# Patient Record
Sex: FEMALE | Race: ASIAN | NOT HISPANIC OR LATINO | Employment: UNEMPLOYED | URBAN - METROPOLITAN AREA
[De-identification: names, ages, dates, MRNs, and addresses within clinical notes are randomized per-mention and may not be internally consistent; named-entity substitution may affect disease eponyms.]

---

## 2017-01-16 ENCOUNTER — OFFICE VISIT (OUTPATIENT)
Dept: FAMILY MEDICINE | Facility: CLINIC | Age: 57
End: 2017-01-16

## 2017-01-16 VITALS
WEIGHT: 134.6 LBS | OXYGEN SATURATION: 97 % | SYSTOLIC BLOOD PRESSURE: 136 MMHG | HEIGHT: 59 IN | HEART RATE: 82 BPM | RESPIRATION RATE: 18 BRPM | DIASTOLIC BLOOD PRESSURE: 82 MMHG | TEMPERATURE: 98.3 F | BODY MASS INDEX: 27.13 KG/M2

## 2017-01-16 DIAGNOSIS — R10.13 EPIGASTRIC PAIN: ICD-10-CM

## 2017-01-16 DIAGNOSIS — F41.9 ANXIETY: Primary | ICD-10-CM

## 2017-01-16 RX ORDER — OMEPRAZOLE 40 MG/1
40 CAPSULE, DELAYED RELEASE ORAL DAILY
Qty: 30 CAPSULE | Refills: 1 | Status: SHIPPED
Start: 2017-01-16 | End: 2017-12-08

## 2017-01-16 RX ORDER — ESCITALOPRAM OXALATE 20 MG/1
20 TABLET ORAL DAILY
Qty: 90 TABLET | Refills: 1 | Status: SHIPPED
Start: 2017-01-16 | End: 2017-12-08

## 2017-01-16 NOTE — PROGRESS NOTES
Preceptor Attestation:  Patient's case reviewed and discussed with Yanet Rossi,  resident and I evaluated the patient. I agree with written assessment and plan of care.  Supervising Physician:  Frederic Finn MD  [facility]

## 2017-01-16 NOTE — PATIENT INSTRUCTIONS
We will send you to the gastroenterologist for further evaluation of your stomach pain.           Referral faxed to [facility] and they will contact patient to schedule appointment.   Munir    What: Gastro  Where: [facility],  [address] , [place] [address]  When: 03/24/17 at 10:10am    Who is with:   Telephone: [phone]  Fax:   Any additional comments: The pt called to have me reschedule her appointment with gastrom, which I was able to rescheduled for the above date and time.    Scheduled by: RIKY Long

## 2017-01-16 NOTE — PROGRESS NOTES
HPI:       Bushra Levy is a 57 year old  female with a significant past medical history of gallbladder polyp, Hx positive FIT (refused colonoscopy), and nonalcoholic steatohepatitis who presents for follow up of concern(s) listed below    ANXIETY -   - has been taking Lexapro.   - It has helped a little bit.   - Has had counseling in the past, was not helpful, not interested in returning.     ABDOMINAL PAIN - chronic, ~ 5 years duration  - Pain more severe when hungry, worse early in the morning. RUQ. Can tell when pressure on it. Has sour bitter taste in mouth at night.   -  Quit taking omeprazole. Pain is not worsening.   - No dark or bloody stools.   - Has nausea after eating sometimes.   - Has not had EGD in past.   - several previous negative H. Pylori tests.     A [employer]  was used for this visit         PMHX:     Patient Active Problem List   Diagnosis     Esophageal reflux     Vitamin D insufficiency     Pre-diabetes     Elevated BP     Fatty liver disease, nonalcoholic     Polyp of gallbladder     Pulmonary nodules     Positive FIT (fecal immunochemical test)     Chronic rhinitis     Encounter for screening for cervical cancer        Current Outpatient Prescriptions   Medication Sig Dispense Refill     cholecalciferol (VITAMIN D) 1000 UNIT tablet Take 1 tablet (1,000 Units) by mouth daily 60 tablet 0     loratadine (CLARITIN) 10 MG tablet Take 1 tablet (10 mg) by mouth daily as needed for allergies 90 tablet 1     escitalopram (LEXAPRO) 10 MG tablet Take 1 tablet (10 mg) by mouth daily 30 tablet 1     blood glucose monitoring (SOFTCLIX) lancets Use to test blood sugar 2 times daily or as directed. 1 Box 1     blood glucose monitoring (ACCU-CHEK JACQUELIN PLUS) meter device kit Use to test blood sugars 1-2 times daily or as directed. 1 kit 0     blood glucose (ACCU-CHEK JACQUELIN) test strip Use to test blood sugars 2 times daily or as directed. 100 strip prn     IBUPROFEN PO Take 2 tablets by  "mouth 3 times daily       Allergies   Allergen Reactions     Nka [No Known Allergies]           Review of Systems:   See HPI above.           Physical Exam:     Filed Vitals:    01/16/17 1020   BP: 136/82   Pulse: 82   Temp: 98.3  F (36.8  C)   TempSrc: Oral   Resp: 18   Height: 4' 11\" (149.9 cm)   Weight: 134 lb 9.6 oz (61.054 kg)   SpO2: 97%     Body mass index is 27.17 kg/(m^2).    GENERAL: alert, oriented, no acute distress  HEART: RRR, no MRG  LUNGS: CTAB, unlabored  ABDOMEN: soft, mildly tender in epigastric/RUQ region  PSYCH: well groomed, good eye contact, mood good, affect congruent with mood.     Assessment and Plan     Bushra is a 58 yo H[place] female presenting for follow up on chronic abdominal pain and anxiety.     1. Anxiety  Mildly improved. Started Lexapro 10 mg almost 1 month ago. Titrate up to 20 mg po daily. Recommended psychology referral for psychotherapy, Bushra declines.   - escitalopram (LEXAPRO) 20 MG tablet; Take 1 tablet (20 mg) by mouth daily  Dispense: 90 tablet; Refill: 1    2. Epigastric pain  Chronic pain, worsening GERD symptoms following discontinuation of omeprazole. Restart omeprazole. Referral to GI for further evaluation, consider EGD with biopsies for H. Pylori. She has a history of positive FIT and should have colonoscopy. Should have counseling to do both tests at the same time.   - omeprazole (PRILOSEC) 40 MG capsule; Take 1 capsule (40 mg) by mouth daily Take 30-60 minutes before a meal.  Dispense: 30 capsule; Refill: 1  - GASTROENTEROLOGY ADULT REFERRAL +/- PROCEDURE; Future    Options for treatment and follow-up care were reviewed with the patient and/or guardian. Bushra Levy and/or guardian engaged in the decision making process and verbalized understanding of the options discussed and agreed with the final plan.    Yanet Rossi DO    Precepted today with: Frederic Finn MD      "

## 2017-02-24 ENCOUNTER — TELEPHONE (OUTPATIENT)
Dept: FAMILY MEDICINE | Facility: CLINIC | Age: 57
End: 2017-02-24

## 2017-02-24 NOTE — TELEPHONE ENCOUNTER
I got a call from the pt today, pt called to have me help reschedule her appointment with [facility].  The pt was not sure if she had an appointment with them already or not.  Pt stated that she will only be available on Mondays, Thursdays, and Fridays.  Pt stated that if I can, she would like an appointment round 10:00am.  I told her that I will call and see what I can do, I will let her know once I am able to set up her appointment.      I called [facility], they informed me that the pt has an appointment on 02/28/17 at 2:10pm, which is a Tuesday.  I told them to reschedule it for the days the pt requested it for.  We were able to set it up for 03/24/17 at 10:10am, which is a Friday.      I called the pt and informed her of her appointment time and date.  I told her that I will print out the facility information for her, write her appointment time and date on it, and mail it to her.

## 2017-02-27 DIAGNOSIS — E55.9 VITAMIN D INSUFFICIENCY: ICD-10-CM

## 2017-02-28 LAB — 25(OH)D3 SERPL-MCNC: 15.8 NG/ML (ref 30–80)

## 2017-03-06 DIAGNOSIS — E55.9 VITAMIN D INSUFFICIENCY: ICD-10-CM

## 2017-03-06 RX ORDER — CHOLECALCIFEROL (VITAMIN D3) 50 MCG
1000 TABLET ORAL DAILY
Qty: 30 TABLET | Refills: 1 | Status: SHIPPED
Start: 2017-03-06 | End: 2017-06-28

## 2017-03-06 NOTE — TELEPHONE ENCOUNTER
----- Message from Yanet Rossi, DO sent at 3/3/2017  2:16 PM CST -----  Can you call Bushra to see if she took her vitamin D supplement as prescribed? Her vitamin D level is even lower than last check.    Thank you,    Yanet Rossi, DO

## 2017-03-06 NOTE — TELEPHONE ENCOUNTER
Called patient with the assistance of zulema lynn. And patient hasn't taken Vitamin D for a month and needs a new refill.

## 2017-03-07 NOTE — TELEPHONE ENCOUNTER
I have refilled Ms. Levy's vitamin D supplement. I increased dose to 2000 units daily. Ms. Levy should return for a recheck of vitamin D level in 2 months.     Yanet Rossi, DO

## 2017-03-24 ENCOUNTER — RECORDS - HEALTHEAST (OUTPATIENT)
Dept: ADMINISTRATIVE | Facility: OTHER | Age: 57
End: 2017-03-24

## 2017-03-24 ENCOUNTER — TRANSFERRED RECORDS (OUTPATIENT)
Dept: HEALTH INFORMATION MANAGEMENT | Facility: CLINIC | Age: 57
End: 2017-03-24

## 2017-03-28 ENCOUNTER — HOSPITAL ENCOUNTER (OUTPATIENT)
Dept: ULTRASOUND IMAGING | Facility: HOSPITAL | Age: 57
Discharge: HOME | End: 2017-03-28
Attending: INTERPRETER

## 2017-03-28 ENCOUNTER — TRANSFERRED RECORDS (OUTPATIENT)
Dept: HEALTH INFORMATION MANAGEMENT | Facility: CLINIC | Age: 57
End: 2017-03-28

## 2017-03-28 DIAGNOSIS — R10.11 RUQ ABDOMINAL PAIN: ICD-10-CM

## 2017-06-23 ENCOUNTER — OFFICE VISIT (OUTPATIENT)
Dept: FAMILY MEDICINE | Facility: CLINIC | Age: 57
End: 2017-06-23

## 2017-06-23 VITALS
BODY MASS INDEX: 27.05 KG/M2 | SYSTOLIC BLOOD PRESSURE: 130 MMHG | WEIGHT: 134.2 LBS | HEIGHT: 59 IN | HEART RATE: 76 BPM | OXYGEN SATURATION: 99 % | TEMPERATURE: 97.9 F | DIASTOLIC BLOOD PRESSURE: 76 MMHG

## 2017-06-23 DIAGNOSIS — R73.03 PRE-DIABETES: ICD-10-CM

## 2017-06-23 DIAGNOSIS — M67.911 TENDINOPATHY OF ROTATOR CUFF, RIGHT: ICD-10-CM

## 2017-06-23 DIAGNOSIS — N28.1 RENAL CYST, RIGHT: ICD-10-CM

## 2017-06-23 DIAGNOSIS — K21.9 GASTROESOPHAGEAL REFLUX DISEASE, ESOPHAGITIS PRESENCE NOT SPECIFIED: ICD-10-CM

## 2017-06-23 DIAGNOSIS — R03.0 ELEVATED BLOOD PRESSURE READING WITHOUT DIAGNOSIS OF HYPERTENSION: ICD-10-CM

## 2017-06-23 DIAGNOSIS — E55.9 VITAMIN D INSUFFICIENCY: Primary | ICD-10-CM

## 2017-06-23 LAB — HBA1C MFR BLD: 5.9 % (ref 4.1–5.7)

## 2017-06-23 NOTE — PROGRESS NOTES
HPI:       Bushra Levy is a 57 year old  female with a significant past medical history of elevated BP without diagnosis of hypertension, esophageal reflux, pre-diabetes, and vitamin D deficiency who presents to discuss the following.     ELEVATED BLOOD PRESSURE - #1: 145/79 #2: 130/76  - No orthopnea, no lower extremity edema, no chest pain, no dyspnea on exertion    PREDIABETES -   - A1c 6.2 in May 2015  - BMI 27  - No polydipsia, no polyuria, no weight loss, no changed in vision   - Diet: vegetables, meat, a little rice  - Exercise: 2-3x per week, walks around 2 blocks 3 times      VITAMIN D INSUFFICIENCY -   - Completed 2 month course of supplementation 2000 IU daily  - Unclear if feels any different  - Gets sun exposure in the summer     ANXIETY -   - Taking Lexapro as needed, helps a little bit    EPIGASTRIC PAIN -   - Went to [facility], had US, no EGD and no H. Pylori testing per patient report, pending labs  - Has reflux daily when wakes up   - Takes ibuprofen once a day, she has pain in arms and hard to reach, improved w/ ibuprofen. [facility]. Has used acetaminophen in the past.   - Omeprazole - takes daily     SHOULDER PAIN -   - Chronic shoulder pain   - Hurts when reaches above head  - Improved with ibuprofen, acetaminophen less helpful   - No weakness  - Good ROM  - Bilateral symptoms, but R >>> L           PMHX:     Patient Active Problem List   Diagnosis     Esophageal reflux     Vitamin D insufficiency     Pre-diabetes     Elevated BP     Fatty liver disease, nonalcoholic     Polyp of gallbladder     Pulmonary nodules     Positive FIT (fecal immunochemical test)     Chronic rhinitis     Encounter for screening for cervical cancer        Current Outpatient Prescriptions   Medication Sig Dispense Refill     omeprazole (PRILOSEC) 40 MG capsule Take 1 capsule (40 mg) by mouth daily Take 30-60 minutes before a meal. 30 capsule 1     loratadine (CLARITIN) 10 MG tablet Take 1 tablet  "(10 mg) by mouth daily as needed for allergies 90 tablet 1     cholecalciferol 2000 UNITS TABS Take 1,000 Units by mouth daily (Patient not taking: Reported on 6/23/2017) 30 tablet 1     escitalopram (LEXAPRO) 20 MG tablet Take 1 tablet (20 mg) by mouth daily (Patient not taking: Reported on 6/23/2017) 90 tablet 1     blood glucose monitoring (SOFTCLIX) lancets Use to test blood sugar 2 times daily or as directed. 1 Box 1     blood glucose monitoring (ACCU-CHEK JACQUELIN PLUS) meter device kit Use to test blood sugars 1-2 times daily or as directed. 1 kit 0     blood glucose (ACCU-CHEK JACQUELIN) test strip Use to test blood sugars 2 times daily or as directed. 100 strip prn     IBUPROFEN PO Take 2 tablets by mouth 3 times daily        Allergies   Allergen Reactions     Food      Some food allergies     Nka [No Known Allergies]             Review of Systems:   See HPI above           Physical Exam:     Vitals:    06/23/17 1051 06/23/17 1328   BP: 145/79 130/76   BP Location: Right arm    Patient Position: Chair    Cuff Size: Adult Regular    Pulse: 76    Temp: 97.9  F (36.6  C)    TempSrc: Oral    SpO2: 99%    Weight: 134 lb 3.2 oz (60.9 kg)    Height: 4' 11\" (149.9 cm)      Body mass index is 27.11 kg/(m^2).    GENERAL: middle age  female, pleasant, no acute distress, well groomed  HEART: regular rate and rhythm, no murmurs  LUNGS: clear to auscultation bilaterally  ABDOMEN: soft, nontender to palpation, bowel sounds present  MSK: active shoulder ROM full bilaterally, strength RUQ 5/5 throughout bilaterally, sensation intact, negative arm drop test, empty can test painful on right    Assessment and Plan     1. Vitamin D insufficiency  History of vitamin D deficiency, s/p replacement x 2 months with vit D 2000 IU daily. No change in symptoms. Will recheck today.   Encouraged daily sun exposure x 20 minutes (then sunscreen for cancer prevention). Although may not feel different, important for bone health to have normal " vitamin D levels.   - Vitamin D 25-Hydroxy ([facility])    2. Pre-diabetes  Due for repeat A1c today. Returned at 5.9 (improved from 6.2 in May 2015). She has a family member (mother) with diabetes. We reviewed complications of diabetes (blindness, heart disease, kidney failure, skin infections, vascular damage, amputations) and desire to prevent/prolong transition to overt diabetes (Dx w/ A1c 6.5+). Bushra has no symptoms of diabetes disease. Encouraged continued emphasis on a healthy diet, predominately meats and vegetables, okay to have small portions of rice. Discussed high glycemic index of rice and large spikes in blood sugars with rice. Also encouraged to continue with regular exercise.   Discuss metformin for overweight patients with prediabetes to help delay progression to diabetes.   - Hemoglobin A1c ([facility])    3. Elevated BP  BP initially elevated, but after allowing time to rest, BP normal range. Screen for symptoms of CHF secondary to HTN negative. Continue to monitor BP's at each visit. Goal BP < 140/90 until 63 yo.     4. Renal cyst, right  Per patient's result letter from [facility], simple renal cyst present on ultrasound of RUQ. No mention of size. Pending [facility] records for further details.     5. Gastroesophageal reflux disease, esophagitis presence not specified  Referred to [facility] for EGD and H pylori biopsies, however patient had RUQ ultrasound instead. Still symptomatic with reflux every morning. Discussed daily use of NSAIDs are likely playing a role. Recommended cessation of NSAID use. Will continue omeprazole and test for H. Pylori today.   - H. Pylori Agn Fecal ([facility]); Future - Negative.   PVC call to [facility] - patient refused EGD in April 2017.   With negative H pylori, will continue to monitor closely for red flag symptoms and encouraging lifestyle changes to decrease aggravation of GERD.    6. Tendinopathy of rotator cuff, right  No sign of RC tear or frozen shoulder. I do not suspect severe  arthritis as ROM is full. Will refer to physical therapy for rehabilitation. Encouraged rest as possible. Trial acetaminophen for pain instead of NSAIDs.   - PHYSICAL THERAPY REFERRAL; Future    Options for treatment and follow-up care were reviewed with the patient and/or guardian. Bushra Levy and/or guardian engaged in the decision making process and verbalized understanding of the options discussed and agreed with the final plan.    Yanet Rossi DO      Precepted today with: Ar Mena MD

## 2017-06-23 NOTE — MR AVS SNAPSHOT
After Visit Summary   6/23/2017    Bushra Levy    MRN: [id number]           Patient Information     Date Of Birth          1960        Visit Information        Provider Department      6/23/2017 11:00 AM Yanet Rossi, DO [facility]        Today's Diagnoses     Vitamin D insufficiency    -  1    Pre-diabetes        Elevated blood pressure reading without diagnosis of hypertension        Renal cyst, right        Gastroesophageal reflux disease, esophagitis presence not specified        Tendinopathy of rotator cuff, right          Care Instructions    Referral for ( TEST )  :      Physical Therapy  LOCATION/PLACE/Provider :    [facility]  [address] [place] [address]  DATE & TIME :     6-29-17 at 2:30  PHONE :     [phone]  FAX :     [phone]  ADDITIONAL INFORMATION :     NA  Appointment made by clinic staff/:    [facility]AC[facility]O            Follow-ups after your visit        Additional Services     PHYSICAL THERAPY REFERRAL       PT/OT REFERRAL  Bushra Levy  1960  Phone #: [phone] (home)    needed: Yes  Language: Appleong    PT/OT  Facility:   Other: Patient preference    History: Chronic right shoulder pain, rotator cuff tendinopathy    Precautions/Contraindications: None    Imaging Studies: None    Surgical Procedure/Test Results: None    Treatment Goals:   Increase: Flexibility, Strength and ROM  Decrease: Pain    Prognosis: excellent    Visits: Up to 12    Evaluate: Evaluate and treat    Plan: Manual Therapy, Flexibility Exercise and Strength Exercise                  Who to contact     Please call your clinic at [phone] to:    Ask questions about your health    Make or cancel appointments    Discuss your medicines    Learn about your test results    Speak to your doctor   If you have compliments or concerns about an experience at your clinic, or if you wish to file a complaint, please contact [facility]  "Patient Relations at [phone] or email us at [email or web address]         Additional Information About Your Visit        [employer] Information     Info is an electronic gateway that provides easy, online access to your medical records. With Info, you can request a clinic appointment, read your test results, renew a prescription or communicate with your care team.     To sign up for Info visit the website at [email or web address]   You will be asked to enter the access code listed below, as well as some personal information. Please follow the directions to create your username and password.     Your access code is: [email or web address]  Expires: 10/4/2017  8:07 AM     Your access code will  in 90 days. If you need help or a new code, please contact your [facility] or call [phone] for assistance.        Care EveryWhere ID     This is your Care EveryWhere ID. This could be used by other organizations to access your [facility] medical records  [id number]        Your Vitals Were     Pulse Temperature Height Pulse Oximetry BMI (Body Mass Index)       76 97.9  F (36.6  C) (Oral) 4' 11\" (149.9 cm) 99% 27.11 kg/m2        Blood Pressure from Last 3 Encounters:   17 130/76   17 136/82   16 136/80    Weight from Last 3 Encounters:   17 134 lb 3.2 oz (60.9 kg)   17 134 lb 9.6 oz (61.1 kg)   16 131 lb 6.4 oz (59.6 kg)              We Performed the Following     Hemoglobin A1c ([facility])     Vitamin D 25-Hydroxy ([facility])          Where to get your medicines      [id number] - [place], [place] - [address] AT [facility] & [address], [place] [address]     Phone:  [phone]     Vitamin D3 2000 UNITS Tabs          Primary Care Provider Office Phone # Fax #    Yanet Marva Rossi [phone]       [facility] " [address] [facility] [address]        Equal Access to Services     CEDRICCOLLEEN CALVIN : Hadii kelsie ku doris Burrell, waamberda luqantoniaha, qaleobardota kakenny nicanortrelllea, waxharsha igorin hayaaivonne vickgodfrey santamariatati zelaya. So [facility] [phone].    ATENCIÓN: Si habla español, tiene a ryan disposición servicios gratuitos de asistencia lingüística. Llame al [phone].    We comply with applicable federal civil rights laws and [place] laws. We do not discriminate on the basis of race, color, national origin, age, disability sex, sexual orientation or gender identity.            Thank you!     Thank you for choosing [facility]  for your care. Our goal is always to provide you with excellent care. Hearing back from our patients is one way we can continue to improve our services. Please take a few minutes to complete the written survey that you may receive in the mail after your visit with us. Thank you!             Your Updated Medication List - Protect others around you: Learn how to safely use, store and throw away your medicines at www.disposemymeds.org.          This list is accurate as of: 6/23/17 11:59 PM.  Always use your most recent med list.                   Brand Name Dispense Instructions for use Diagnosis    blood glucose monitoring lancets     1 Box    Use to test blood sugar 2 times daily or as directed.        blood glucose monitoring meter device kit     1 kit    Use to test blood sugars 1-2 times daily or as directed.        blood glucose monitoring test strip    ACCU-CHEK JACQUELIN    100 strip    Use to test blood sugars 2 times daily or as directed.    Prediabetes       escitalopram 20 MG tablet    LEXAPRO    90 tablet    Take 1 tablet (20 mg) by mouth daily    Anxiety       IBUPROFEN PO      Take 2 tablets by mouth 3 times daily        loratadine 10 MG tablet    CLARITIN    90 tablet    Take 1 tablet (10 mg) by mouth daily as needed for allergies    Chronic rhinitis       omeprazole 40 MG capsule     priLOSEC    30 capsule    Take 1 capsule (40 mg) by mouth daily Take 30-60 minutes before a meal.    Epigastric pain       Vitamin D3 2000 UNITS Tabs     90 tablet    Take 1,000 Units by mouth daily    Vitamin D insufficiency

## 2017-06-23 NOTE — PATIENT INSTRUCTIONS
Referral for ( TEST )  :      Physical Therapy  LOCATION/PLACE/Provider :    [facility]  [address] [place] [address]  DATE & TIME :     6-29-17 at 2:30  PHONE :     [phone]  FAX :     [phone]  ADDITIONAL INFORMATION :     NA  Appointment made by clinic staff/:    [facility]

## 2017-06-26 ENCOUNTER — AMBULATORY - HEALTHEAST (OUTPATIENT)
Dept: ADMINISTRATIVE | Facility: REHABILITATION | Age: 57
End: 2017-06-26

## 2017-06-26 DIAGNOSIS — M67.911 TENDINOPATHY OF ROTATOR CUFF, RIGHT: ICD-10-CM

## 2017-06-26 LAB — 25(OH)D3 SERPL-MCNC: 26.7 NG/ML (ref 30–80)

## 2017-06-27 DIAGNOSIS — K21.9 GASTROESOPHAGEAL REFLUX DISEASE, ESOPHAGITIS PRESENCE NOT SPECIFIED: ICD-10-CM

## 2017-06-28 LAB — H PYLORI AG STL QL IA: NEGATIVE

## 2017-06-28 RX ORDER — CHOLECALCIFEROL (VITAMIN D3) 50 MCG
1000 TABLET ORAL DAILY
Qty: 90 TABLET | Refills: 3 | Status: SHIPPED | OUTPATIENT
Start: 2017-06-28 | End: 2017-12-08

## 2017-06-29 ENCOUNTER — OFFICE VISIT - HEALTHEAST (OUTPATIENT)
Dept: PHYSICAL THERAPY | Facility: REHABILITATION | Age: 57
End: 2017-06-29

## 2017-06-29 DIAGNOSIS — R29.3 POOR POSTURE: ICD-10-CM

## 2017-06-29 DIAGNOSIS — M75.42 SHOULDER IMPINGEMENT SYNDROME, LEFT: ICD-10-CM

## 2017-06-29 DIAGNOSIS — F32.A DEPRESSION: ICD-10-CM

## 2017-06-29 DIAGNOSIS — M79.18 MYOFASCIAL PAIN: ICD-10-CM

## 2017-06-29 DIAGNOSIS — M62.81 GENERALIZED MUSCLE WEAKNESS: ICD-10-CM

## 2017-06-29 DIAGNOSIS — M25.811 SHOULDER IMPINGEMENT, RIGHT: ICD-10-CM

## 2017-07-13 ENCOUNTER — OFFICE VISIT - HEALTHEAST (OUTPATIENT)
Dept: PHYSICAL THERAPY | Facility: REHABILITATION | Age: 57
End: 2017-07-13

## 2017-07-13 DIAGNOSIS — M75.42 SHOULDER IMPINGEMENT SYNDROME, LEFT: ICD-10-CM

## 2017-07-13 DIAGNOSIS — M25.811 SHOULDER IMPINGEMENT, RIGHT: ICD-10-CM

## 2017-07-13 DIAGNOSIS — R29.3 POOR POSTURE: ICD-10-CM

## 2017-07-13 DIAGNOSIS — M62.81 GENERALIZED MUSCLE WEAKNESS: ICD-10-CM

## 2017-07-27 ENCOUNTER — OFFICE VISIT - HEALTHEAST (OUTPATIENT)
Dept: PHYSICAL THERAPY | Facility: REHABILITATION | Age: 57
End: 2017-07-27

## 2017-07-27 DIAGNOSIS — M75.42 SHOULDER IMPINGEMENT SYNDROME, LEFT: ICD-10-CM

## 2017-07-27 DIAGNOSIS — R29.3 POOR POSTURE: ICD-10-CM

## 2017-07-27 DIAGNOSIS — M25.811 SHOULDER IMPINGEMENT, RIGHT: ICD-10-CM

## 2017-07-27 DIAGNOSIS — M62.81 GENERALIZED MUSCLE WEAKNESS: ICD-10-CM

## 2017-12-08 ENCOUNTER — OFFICE VISIT (OUTPATIENT)
Dept: FAMILY MEDICINE | Facility: CLINIC | Age: 57
End: 2017-12-08

## 2017-12-08 VITALS
HEIGHT: 60 IN | HEART RATE: 86 BPM | RESPIRATION RATE: 16 BRPM | OXYGEN SATURATION: 97 % | SYSTOLIC BLOOD PRESSURE: 164 MMHG | DIASTOLIC BLOOD PRESSURE: 90 MMHG | BODY MASS INDEX: 26.9 KG/M2 | WEIGHT: 137 LBS | TEMPERATURE: 98.3 F

## 2017-12-08 DIAGNOSIS — R73.03 PREDIABETES: ICD-10-CM

## 2017-12-08 DIAGNOSIS — E55.9 VITAMIN D INSUFFICIENCY: ICD-10-CM

## 2017-12-08 DIAGNOSIS — R10.13 EPIGASTRIC PAIN: ICD-10-CM

## 2017-12-08 DIAGNOSIS — I10 BENIGN ESSENTIAL HYPERTENSION: Primary | ICD-10-CM

## 2017-12-08 DIAGNOSIS — R53.83 FATIGUE, UNSPECIFIED TYPE: ICD-10-CM

## 2017-12-08 LAB
% GRANULOCYTES: 73.8 %G (ref 40–75)
BUN SERPL-MCNC: 10 MG/DL (ref 7–30)
CALCIUM SERPL-MCNC: 9.7 MG/DL (ref 8.5–10.4)
CHLORIDE SERPLBLD-SCNC: 102 MMOL/L (ref 94–109)
CO2 SERPL-SCNC: 27 MMOL/L (ref 20–32)
CREAT SERPL-MCNC: 0.6 MG/DL (ref 0.6–1.3)
EGFR CALCULATED (BLACK REFERENCE): >90 ML/MIN
EGFR CALCULATED (NON BLACK REFERENCE): >90 ML/MIN
GLUCOSE SERPL-MCNC: 120 MG/DL (ref 60–109)
GRANULOCYTES #: 5.5 K/UL (ref 1.6–8.3)
HCT VFR BLD AUTO: 44.8 % (ref 35–47)
HEMOGLOBIN: 14.2 G/DL (ref 11.7–15.7)
LYMPHOCYTES # BLD AUTO: 1.5 K/UL (ref 0.8–5.3)
LYMPHOCYTES NFR BLD AUTO: 20.5 %L (ref 20–48)
MCH RBC QN AUTO: 29.9 PG (ref 26.5–35)
MCHC RBC AUTO-ENTMCNC: 31.7 G/DL (ref 32–36)
MCV RBC AUTO: 94.4 FL (ref 78–100)
MID #: 0.4 K/UL (ref 0–2.2)
MID %: 5.7 %M (ref 0–20)
PLATELET # BLD AUTO: 196 K/UL (ref 150–450)
POTASSIUM SERPL-SCNC: 4 MMOL/L (ref 3.4–5.3)
RBC # BLD AUTO: 4.75 M/UL (ref 3.8–5.2)
SODIUM SERPL-SCNC: 139 MMOL/L (ref 133–144)
TSH SERPL DL<=0.05 MIU/L-ACNC: 0.7 UIU/ML (ref 0.3–5)
WBC # BLD AUTO: 7.4 K/UL (ref 4–11)

## 2017-12-08 RX ORDER — OMEPRAZOLE 40 MG/1
40 CAPSULE, DELAYED RELEASE ORAL DAILY
Qty: 30 CAPSULE | Refills: 1 | Status: SHIPPED | OUTPATIENT
Start: 2017-12-08 | End: 2018-12-05

## 2017-12-08 RX ORDER — LANCETS
EACH MISCELLANEOUS
Qty: 100 EACH | Status: SHIPPED | OUTPATIENT
Start: 2017-12-08 | End: 2018-10-19

## 2017-12-08 RX ORDER — CHOLECALCIFEROL (VITAMIN D3) 50 MCG
1000 TABLET ORAL DAILY
Qty: 90 TABLET | Refills: 3 | Status: SHIPPED | OUTPATIENT
Start: 2017-12-08 | End: 2018-12-05

## 2017-12-08 RX ORDER — LISINOPRIL 5 MG/1
5 TABLET ORAL DAILY
Qty: 30 TABLET | Refills: 1 | Status: SHIPPED | OUTPATIENT
Start: 2017-12-08 | End: 2017-12-08

## 2017-12-08 NOTE — PATIENT INSTRUCTIONS
Your medication list is printed, please keep this with you, it is helpful to bring this current list to any other medical appointments, the emergency room or hospital.    If you had lab testing today and your results are reassuring or normal they will be be mailed to you within 7 days.     If the lab tests need quick action we will call you with the results.   The phone number we will call with results is # [phone] (home) . If this is not the best number please call our clinic and change the number.    If you need any refills please call your pharmacy and they will contact us.    If you have any further concerns or wish to schedule another appointment you must call our office during normal business hours  [phone] (8-5:00 M-F)  If you have urgent medical questions that cannot wait  you may also call [phone] at any time of day.  If you have a medical emergency please call [phone].    Thank you for coming to [facility].      Referral for Gastroenterology faxed to [facility] for Upper Endoscopy and pt will be contacted to schedule appointment.  Uzma

## 2017-12-08 NOTE — LETTER
December 11, 2017      Bushra Levy  [address] [place] [address]        Dear Bushra,  Your lab values look good and do not give a reason for your fatigue. Your thyroid is normal, kidney function and electrolytes are normal, and your blood count is normal.     Please see below for your test results.    Resulted Orders   CBC with Diff Plt ([facility])   Result Value Ref Range    WBC 7.4 4.0 - 11.0 K/uL    Lymphocytes # 1.5 0.8 - 5.3 K/uL    % Lymphocytes 20.5 20.0 - 48.0 %L    Mid # 0.4 0.0 - 2.2 K/uL    Mid % 5.7 0.0 - 20.0 %M    GRANULOCYTES # 5.5 1.6 - 8.3 K/uL    % Granulocytes 73.8 40.0 - 75.0 %G    RBC 4.75 3.80 - 5.20 M/uL    Hemoglobin 14.2 11.7 - 15.7 g/dL    Hematocrit 44.8 35.0 - 47.0 %    MCV 94.4 78.0 - 100.0 fL    MCH 29.9 26.5 - 35.0 pg    MCHC 31.7 (L) 32.0 - 36.0 g/dL    Platelets 196.0 150.0 - 450.0 K/uL   TSH  Sensitive ([facility])   Result Value Ref Range    TSH 0.70 0.30 - 5.00 uIU/mL    Narrative    Test performed by:  [facility] [address] [place] [address]   Basic Metabolic Panel ([facility]) - Results < 1 hr   Result Value Ref Range    Glucose 120.0 (H) 60.0 - 109.0 mg/dL    Urea Nitrogen 10.0 7.0 - 30.0 mg/dL    Creatinine 0.6 0.6 - 1.3 mg/dL    Sodium 139.0 133.0 - 144.0 mmol/L    Potassium 4.0 3.4 - 5.3 mmol/L    Chloride 102.0 94.0 - 109.0 mmol/L    Carbon Dioxide 27.0 20.0 - 32.0 mmol/L    Calcium 9.7 8.5 - 10.4 mg/dL    eGFR Calculated (Non Black Reference) >90 >60.0 mL/min    eGFR Calculated (Black Reference) >90 >60.0 mL/min       If you have any questions, please call the clinic to make an appointment.    Sincerely,    Chalino Majano MD

## 2017-12-08 NOTE — MR AVS SNAPSHOT
After Visit Summary   12/8/2017    Bushra Levy    MRN: [id number]           Patient Information     Date Of Birth          1960        Visit Information        Provider Department      12/8/2017 2:20 PM Chalino Majano MD [facility]        Today's Diagnoses     Benign essential hypertension    -  1    Fatigue, unspecified type        Vitamin D insufficiency        Prediabetes        Epigastric pain          Care Instructions      Your medication list is printed, please keep this with you, it is helpful to bring this current list to any other medical appointments, the emergency room or hospital.    If you had lab testing today and your results are reassuring or normal they will be be mailed to you within 7 days.     If the lab tests need quick action we will call you with the results.   The phone number we will call with results is # [phone] (home) . If this is not the best number please call our clinic and change the number.    If you need any refills please call your pharmacy and they will contact us.    If you have any further concerns or wish to schedule another appointment you must call our office during normal business hours  [phone] (8-5:00 M-F)  If you have urgent medical questions that cannot wait  you may also call [phone] at any time of day.  If you have a medical emergency please call [phone].    Thank you for coming to [facility].      Referral for Gastroenterology faxed to [facility] for [facility] and pt will be contacted to schedule appointment.  Uzma          Follow-ups after your visit        Additional Services     GASTROENTEROLOGY ADULT REF PROCEDURE ONLY       Last Lab Result: Creatinine (mg/dL)       Date                     Value                 12/22/2016               0.67             ----------  Body mass index is 27.21 kg/(m^2).     Needed:  Yes  Language:  Hmong    Patient will be contacted to schedule procedure.     Please  be aware that coverage of these services is subject to the terms and limitations of your health insurance plan.  Call member services at your health plan with any benefit or coverage questions.  Any procedures must be performed at a [place] facility OR coordinated by your clinic's referral office.    Please bring the following with you to your appointment:    (1) Any X-Rays, CTs or MRIs which have been performed.  Contact the facility where they were done to arrange for  prior to your scheduled appointment.    (2) List of current medications   (3) This referral request   (4) Any documents/labs given to you for this referral                  Follow-up notes from your care team     Return in about 4 weeks (around 1/5/2018) for Physical Exam.      Your next 10 appointments already scheduled     Dec 22, 2017  2:00 PM CST   Return Visit with Chalino Majano MD   [facility] ([facility])    [address] [place] [address]   [phone]              Who to contact     Please call your clinic at [phone] to:    Ask questions about your health    Make or cancel appointments    Discuss your medicines    Learn about your test results    Speak to your doctor   If you have compliments or concerns about an experience at your clinic, or if you wish to file a complaint, please contact [facility] Patient Relations at [phone] or email us at [email or web address]         Additional Information About Your Visit        MyChart Information     [employer] is an electronic gateway that provides easy, online access to your medical records. With GateMe, you can request a clinic appointment, read your test results, renew a prescription or communicate with your care team.     To sign up for [employer] visit the website at [email or web address]   You will be asked to enter the access code listed below, as well as some personal information. Please follow the  "directions to create your username and password.     Your access code is: [email or web address]  Expires: 3/17/2018 11:03 PM     Your access code will  in 90 days. If you need help or a new code, please contact your [facility] or call [phone] for assistance.        Care EveryWhere ID     This is your Care EveryWhere ID. This could be used by other organizations to access your [place] medical records  [id number]        Your Vitals Were     Pulse Temperature Respirations Height Pulse Oximetry BMI (Body Mass Index)    86 98.3  F (36.8  C) (Oral) 16 4' 11.5\" (151.1 cm) 97% 27.21 kg/m2       Blood Pressure from Last 3 Encounters:   17 164/90   17 130/76   17 136/82    Weight from Last 3 Encounters:   17 137 lb (62.1 kg)   17 134 lb 3.2 oz (60.9 kg)   17 134 lb 9.6 oz (61.1 kg)              We Performed the Following     Basic Metabolic Panel ([facility]) - Results < 1 hr     CBC with Diff Plt ([facility])     GASTROENTEROLOGY ADULT REF PROCEDURE ONLY     TSH  Sensitive ([facility])          Where to get your medicines      These medications were sent to [id number] - [place], [place] - [address], [place] [address]     Phone:  [phone]     blood glucose monitoring lancets    blood glucose monitoring test strip    omeprazole 40 MG capsule    Vitamin D3 2000 UNITS Tabs          Primary Care Provider Office Phone # Fax #    Yanet Durham DO Flo [phone]       [facility] [address] [place] [address]        Equal Access to Services     YASMIN SIMPSON : Anna Burrell, wacamila chavez, qaybta kaalmada cm, ana m zelaya. So [facility] [phone].    ATENCIÓN: Si habla español, tiene a ryan disposición servicios gratuitos de asistencia lingüística. Llame al [phone].    We comply with " applicable federal civil rights laws and [place] laws. We do not discriminate on the basis of race, color, national origin, age, disability, sex, sexual orientation, or gender identity.            Thank you!     Thank you for choosing [facility]  for your care. Our goal is always to provide you with excellent care. Hearing back from our patients is one way we can continue to improve our services. Please take a few minutes to complete the written survey that you may receive in the mail after your visit with us. Thank you!             Your Updated Medication List - Protect others around you: Learn how to safely use, store and throw away your medicines at www.disposemymeds.org.          This list is accurate as of: 12/8/17 11:59 PM.  Always use your most recent med list.                   Brand Name Dispense Instructions for use Diagnosis    blood glucose monitoring lancets     100 each    Use to test blood sugar 2 times daily or as directed.    Prediabetes       blood glucose monitoring meter device kit     1 kit    Use to test blood sugars 1-2 times daily or as directed.        blood glucose monitoring test strip    ACCU-CHEK JACQUELIN    100 strip    Use to test blood sugars 2 times daily or as directed.    Prediabetes       IBUPROFEN PO      Take 2 tablets by mouth 3 times daily        loratadine 10 MG tablet    CLARITIN    90 tablet    Take 1 tablet (10 mg) by mouth daily as needed for allergies    Chronic rhinitis       omeprazole 40 MG capsule    priLOSEC    30 capsule    Take 1 capsule (40 mg) by mouth daily Take 30-60 minutes before a meal.    Epigastric pain       Vitamin D3 2000 UNITS Tabs     90 tablet    Take 1,000 Units by mouth daily    Vitamin D insufficiency

## 2017-12-08 NOTE — PROGRESS NOTES
S:  Bushra Levy is a 57 year old year old female who  has a past medical history of Diabetes (H); Elevated BP (3/5/2014); Esophageal reflux (2/26/2014); Fatty liver disease, nonalcoholic (6/12/2014); Lyme disease; Polyp of gallbladder (7/14/2014); Pre-diabetes (3/5/2014); Pulmonary nodules (12/16/2015); and Syncope and collapse (2/26/2014). who presents to discuss:      1. Fatigue   Feeling weak, hungry, tired   When she eats sugar she feels better  This has been going on for about 1 year  Has been checking BG in the morning it is 100-111, does not check it when she is feeling fatigued  No weight change, no changes to skin or hair  Stopped lexapro 2 months ago because it does not help    2. Epigastric pain  -continues, worse in the morning, worse with hard food, better with soft food  -still needs omeprazole  -previously had refused EGD, willing to consider it now  -previous negative H pylori    3. Elevated BP today  -checked her BP at home and it was high 160 systolic, she was scared so she took her husbands BP medicine, felt better afterward  -not sure which medicine it was  -has not taken it since then    Complete ROS otherwise negative.     Past Medical History:   Diagnosis Date     Diabetes (H)      Elevated BP 3/5/2014     Esophageal reflux 2/26/2014     Fatty liver disease, nonalcoholic 6/12/2014     Lyme disease     diagnosis based on symptoms and erythema migrans. treated      Polyp of gallbladder 7/14/2014     Pre-diabetes 3/5/2014     Pulmonary nodules 12/16/2015    Noted on CT 12/4/15. Multiple nodules largest 7x4 mm, follow up recommended at 6 and 24 months      Syncope and collapse 2/26/2014     History reviewed. No pertinent surgical history.  Social History     Social History     Marital status:      Spouse name: N/A     Number of children: N/A     Years of education: N/A     Occupational History     Not on file.     Social History Main Topics     Smoking status: Never Smoker     Smokeless  "tobacco: Never Used     Alcohol use No     Drug use: No     Sexual activity: Not Currently     Other Topics Concern     Not on file     Social History Narrative         O:  Vitals: /90  Pulse 86  Temp 98.3  F (36.8  C) (Oral)  Resp 16  Ht 4' 11.5\" (151.1 cm)  Wt 137 lb (62.1 kg)  SpO2 97%  BMI 27.21 kg/m2    General: Alert, well-appearing, no acute distress  HEENT: PERRL, moist oral mucus membranes  Pulm: clear to auscultation bilaterally, no tachypnea  CV: RRR, no murmur  Abd: soft, mil RUQ tenderness, non-distended, no masses, no guarding no rebound  Ext: Warm, well perfused, no LE edema  Skin: No rash on limited skin exam  Psych: Mood appropriate to visit content, full affect, rational thought content and process      A/P:    1. Elevated BP. One reading in clinic and one reported reading at home. Patient would like to find out what medication her  is on and start that, she will come back to clinic for a recheck and potentially starting anti-hypertensive. We did discuss that it is not recommend to take another person's medication. I would start lisinopril if her BP is still elevated at next check, due to her history of pre-diabetes. Will obtain a baseline BMP today.   - Basic Metabolic Panel ([facility]) - Results < 1 hr    2. Fatigue, unspecified type. 1 year in duration. Will check TSH, CBC and BMP. With history of epigastric pain will check CBC to rule out anemia from GI Bleed.   - CBC with Diff Plt ([facility])  - TSH  Sensitive ([facility])  - Basic Metabolic Panel ([facility]) - Results < 1 hr    3. Vitamin D insufficiency- restart medication, low in June 2017  - Cholecalciferol (VITAMIN D3) 2000 UNITS TABS; Take 1,000 Units by mouth daily  Dispense: 90 tablet; Refill: 3    4. Prediabetes  - blood glucose monitoring (SOFTCLIX) lancets; Use to test blood sugar 2 times daily or as directed.  Dispense: 100 each; Refill: prn  - blood glucose monitoring (ACCU-CHEK JACQUELIN) test strip; Use to test blood " sugars 2 times daily or as directed.  Dispense: 100 strip; Refill: prn    5. Epigastric pain- Patient had previously declined EGD but has reconsidered.   - omeprazole (PRILOSEC) 40 MG capsule; Take 1 capsule (40 mg) by mouth daily Take 30-60 minutes before a meal.  Dispense: 30 capsule; Refill: 1  - GASTROENTEROLOGY ADULT REF PROCEDURE ONLY    Follow up in 2-4 weeks for CPE, deferred today due to acute concerns.     Chalino Majano MD  [facility] Resident  Pager     Precepted with: Desiree Corona MD

## 2017-12-13 ASSESSMENT — PATIENT HEALTH QUESTIONNAIRE - PHQ9: SUM OF ALL RESPONSES TO PHQ QUESTIONS 1-9: 11

## 2017-12-18 NOTE — PROGRESS NOTES
. Preceptor Attestation:  Patient's case reviewed and discussed with Chalino Majano MD Patient seen and discussed with the resident.  I agree with assessment and plan of care.  Supervising Physician:  Desiree Corona MD  [facility]

## 2017-12-22 ENCOUNTER — OFFICE VISIT (OUTPATIENT)
Dept: FAMILY MEDICINE | Facility: CLINIC | Age: 57
End: 2017-12-22
Payer: COMMERCIAL

## 2017-12-22 VITALS
OXYGEN SATURATION: 98 % | HEART RATE: 82 BPM | BODY MASS INDEX: 27.74 KG/M2 | HEIGHT: 59 IN | WEIGHT: 137.6 LBS | SYSTOLIC BLOOD PRESSURE: 134 MMHG | DIASTOLIC BLOOD PRESSURE: 87 MMHG | TEMPERATURE: 98 F

## 2017-12-22 DIAGNOSIS — R53.83 OTHER FATIGUE: ICD-10-CM

## 2017-12-22 DIAGNOSIS — H02.63 XANTHELASMA OF RIGHT EYELID: ICD-10-CM

## 2017-12-22 DIAGNOSIS — R03.0 ELEVATED BLOOD PRESSURE READING WITHOUT DIAGNOSIS OF HYPERTENSION: Primary | ICD-10-CM

## 2017-12-22 NOTE — MR AVS SNAPSHOT
After Visit Summary   12/22/2017    Bushra Levy    MRN: [id number]           Patient Information     Date Of Birth          1960        Visit Information        Provider Department      12/22/2017 2:00 PM Chalino Majano MD [facility]        Care Instructions    Thanks for coming in today Bushra Levy!    Topics discussed this visit:   1. Blood pressure was normal- we will recheck at your next physical    2. Spot underneath the eye: this is most likely xanthelasma which is a collection of cholesterol, we will leave it for now. If you would like we could refer to eye doctor or ENT to remove it    3. Fatigue/mood- glad you are feeling better, let us know if you need anything      Follow up in 3-6 months for a physical      Your medication list is printed, please keep this with you, it is helpful to bring this current list to any other medical appointments, the emergency room or hospital.    If you had lab testing today and your results are reassuring or normal they will be be mailed to you within 7 days.     If the lab tests need quick action we will call you with the results.   The phone number we will call with results is # [phone] (home) . If this is not the best number please call our clinic and change the number.    If you need any refills please call your pharmacy and they will contact us.    If you have any further concerns or wish to schedule another appointment you must call our office during normal business hours  [phone] (8-5:00 M-F)  If you have urgent medical questions that cannot wait  you may also call [phone] at any time of day.  If you have a medical emergency please call [phone].    Thank you for coming to [facility].              Follow-ups after your visit        Follow-up notes from your care team     Return in about 3 months (around 3/22/2018).      Who to contact     Please call your clinic at [phone] to:    Ask questions about your  "health    Make or cancel appointments    Discuss your medicines    Learn about your test results    Speak to your doctor   If you have compliments or concerns about an experience at your clinic, or if you wish to file a complaint, please contact [facility] Patient Relations at [phone] or email us at [email or web address]         Additional Information About Your Visit        Orchid Softwarehart Information     [employer] is an electronic gateway that provides easy, online access to your medical records. With easyfolio, you can request a clinic appointment, read your test results, renew a prescription or communicate with your care team.     To sign up for easyfolio visit the website at [email or web address]   You will be asked to enter the access code listed below, as well as some personal information. Please follow the directions to create your username and password.     Your access code is: [email or web address]  Expires: 3/17/2018 11:03 PM     Your access code will  in 90 days. If you need help or a new code, please contact your [facility] or call [phone] for assistance.        Care EveryWhere ID     This is your Care EveryWhere ID. This could be used by other organizations to access your [place] medical records  [id number]        Your Vitals Were     Pulse Temperature Height Pulse Oximetry BMI (Body Mass Index)       82 98  F (36.7  C) (Oral) 4' 11\" (149.9 cm) 98% 27.79 kg/m2        Blood Pressure from Last 3 Encounters:   17 134/87   17 164/90   17 130/76    Weight from Last 3 Encounters:   17 137 lb 9.6 oz (62.4 kg)   17 137 lb (62.1 kg)   17 134 lb 3.2 oz (60.9 kg)              Today, you had the following     No orders found for display       Primary Care Provider Office Phone # Fax #    Yanet Marva Rossi [phone]       [facility] [address] [place] [address]      "   Equal Access to Services     [facility] Affairs Medical CenterDELFINO : Hadii kelsie vazquez doris Sojerad, waaxda luqadaha, qaybta kaalmada cm, ana m zelaya. So [facility] [phone].    ATENCIÓN: Si habla español, tiene a ryan disposición servicios gratuitos de asistencia lingüística. Llame al [phone].    We comply with applicable federal civil rights laws and [place] laws. We do not discriminate on the basis of race, color, national origin, age, disability, sex, sexual orientation, or gender identity.            Thank you!     Thank you for choosing [facility]  for your care. Our goal is always to provide you with excellent care. Hearing back from our patients is one way we can continue to improve our services. Please take a few minutes to complete the written survey that you may receive in the mail after your visit with us. Thank you!             Your Updated Medication List - Protect others around you: Learn how to safely use, store and throw away your medicines at www.disposemymeds.org.          This list is accurate as of: 12/22/17  2:31 PM.  Always use your most recent med list.                   Brand Name Dispense Instructions for use Diagnosis    blood glucose monitoring lancets     100 each    Use to test blood sugar 2 times daily or as directed.    Prediabetes       blood glucose monitoring meter device kit     1 kit    Use to test blood sugars 1-2 times daily or as directed.        blood glucose monitoring test strip    ACCU-CHEK JACQUELIN    100 strip    Use to test blood sugars 2 times daily or as directed.    Prediabetes       IBUPROFEN PO      Take 2 tablets by mouth 3 times daily        loratadine 10 MG tablet    CLARITIN    90 tablet    Take 1 tablet (10 mg) by mouth daily as needed for allergies    Chronic rhinitis       omeprazole 40 MG capsule    priLOSEC    30 capsule    Take 1 capsule (40 mg) by mouth daily Take 30-60 minutes before a meal.    Epigastric pain       Vitamin D3  2000 UNITS Tabs     90 tablet    Take 1,000 Units by mouth daily    Vitamin D insufficiency

## 2017-12-22 NOTE — PROGRESS NOTES
S:  Bushra Levy is a 57 year old year old female who  has a past medical history of Diabetes (H); Elevated BP (3/5/2014); Esophageal reflux (2/26/2014); Fatty liver disease, nonalcoholic (6/12/2014); Lyme disease; Polyp of gallbladder (7/14/2014); Pre-diabetes (3/5/2014); Pulmonary nodules (12/16/2015); and Syncope and collapse (2/26/2014). who presents to discuss:      1. Blood pressure  -has not taken any medication today  -no chest pain or headache    2. Skin change  Mole below the right eye  Been there for a few months  Not itching, not tender, not bleeding    3. Fatigue follow up  Feeling much better today than at our last visit  Was very fatigued and we checked BMP and thyroid  Thinks it was primarily related to mood          Complete ROS otherwise negative.     Past Medical History:   Diagnosis Date     Diabetes (H)      Elevated BP 3/5/2014     Esophageal reflux 2/26/2014     Fatty liver disease, nonalcoholic 6/12/2014     Lyme disease     diagnosis based on symptoms and erythema migrans. treated      Polyp of gallbladder 7/14/2014     Pre-diabetes 3/5/2014     Pulmonary nodules 12/16/2015    Noted on CT 12/4/15. Multiple nodules largest 7x4 mm, follow up recommended at 6 and 24 months      Syncope and collapse 2/26/2014     No past surgical history on file.  Family History   Problem Relation Age of Onset     Hypertension Other      spouse     DIABETES No family hx of      Coronary Artery Disease No family hx of      Hyperlipidemia No family hx of      CEREBROVASCULAR DISEASE No family hx of      Breast Cancer No family hx of      Colon Cancer No family hx of      Prostate Cancer No family hx of      Other Cancer No family hx of      Depression No family hx of      Anxiety Disorder No family hx of      Substance Abuse No family hx of      MENTAL ILLNESS No family hx of      Anesthesia Reaction No family hx of      Asthma No family hx of      OSTEOPOROSIS No family hx of      Genetic Disorder No family hx  "of      Thyroid Disease No family hx of      Obesity No family hx of      Social History     Social History     Marital status:      Spouse name: N/A     Number of children: N/A     Years of education: N/A     Occupational History     Not on file.     Social History Main Topics     Smoking status: Never Smoker     Smokeless tobacco: Never Used     Alcohol use No     Drug use: No     Sexual activity: Not Currently     Other Topics Concern     Not on file     Social History Narrative       O:  Vitals: /87  Pulse 82  Temp 98  F (36.7  C) (Oral)  Ht 4' 11\" (149.9 cm)  Wt 137 lb 9.6 oz (62.4 kg)  SpO2 98%  BMI 27.79 kg/m2    General: Alert, well-appearing, no acute distress  HEENT: PERRL, moist oral mucus membranes  Pulm: clear to auscultation bilaterally, no tachypnea  CV: RRR, no murmur  Abd: soft, non-tender, non-distended, no masses, no guarding no rebound  Ext: Warm, well perfused, no LE edema  Skin: No rash on limited skin exam  Psych: Mood appropriate to visit content, full affect, rational thought content and process      A/P:    1. Elevated blood pressure reading without diagnosis of hypertension  -Normal BP today, will recheck at CPE but I don't anticipate starting antihypertensives at this point    2. Xanthelasma of right eyelid  -monitor    3. Other fatigue  -improved from last visit, likely related to mood. Patient declines other mental health resources.      Chalino Majano MD  [facility] Resident  Pager     Precepted with: Ar Mena MD      "

## 2017-12-22 NOTE — PATIENT INSTRUCTIONS
Thanks for coming in today Bushra Levy!    Topics discussed this visit:   1. Blood pressure was normal- we will recheck at your next physical    2. Spot underneath the eye: this is most likely xanthelasma which is a collection of cholesterol, we will leave it for now. If you would like we could refer to eye doctor or ENT to remove it    3. Fatigue/mood- glad you are feeling better, let us know if you need anything      Follow up in 3-6 months for a physical      Your medication list is printed, please keep this with you, it is helpful to bring this current list to any other medical appointments, the emergency room or hospital.    If you had lab testing today and your results are reassuring or normal they will be be mailed to you within 7 days.     If the lab tests need quick action we will call you with the results.   The phone number we will call with results is # [phone] (home) . If this is not the best number please call our clinic and change the number.    If you need any refills please call your pharmacy and they will contact us.    If you have any further concerns or wish to schedule another appointment you must call our office during normal business hours  [phone] (8-5:00 M-F)  If you have urgent medical questions that cannot wait  you may also call [phone] at any time of day.  If you have a medical emergency please call [phone].    Thank you for coming to [facility].

## 2017-12-22 NOTE — PROGRESS NOTES
Preceptor Attestation:  Patient's case reviewed and discussed with Chalino Majano MD resident and I evaluated the patient. I agree with written assessment and plan of care.  Supervising Physician:Ar Mena MD    [facility]

## 2018-01-30 ENCOUNTER — TRANSFERRED RECORDS (OUTPATIENT)
Dept: HEALTH INFORMATION MANAGEMENT | Facility: CLINIC | Age: 58
End: 2018-01-30

## 2018-01-30 ENCOUNTER — RECORDS - HEALTHEAST (OUTPATIENT)
Dept: ADMINISTRATIVE | Facility: OTHER | Age: 58
End: 2018-01-30

## 2018-02-01 ENCOUNTER — HOSPITAL ENCOUNTER (OUTPATIENT)
Dept: CT IMAGING | Facility: HOSPITAL | Age: 58
Discharge: HOME | End: 2018-02-01
Attending: PHYSICIAN ASSISTANT

## 2018-02-01 ENCOUNTER — TRANSFERRED RECORDS (OUTPATIENT)
Dept: HEALTH INFORMATION MANAGEMENT | Facility: CLINIC | Age: 58
End: 2018-02-01

## 2018-02-01 DIAGNOSIS — R10.11 RUQ ABDOMINAL PAIN: ICD-10-CM

## 2018-04-13 ENCOUNTER — TRANSFERRED RECORDS (OUTPATIENT)
Dept: HEALTH INFORMATION MANAGEMENT | Facility: CLINIC | Age: 58
End: 2018-04-13

## 2018-10-19 ENCOUNTER — OFFICE VISIT (OUTPATIENT)
Dept: FAMILY MEDICINE | Facility: CLINIC | Age: 58
End: 2018-10-19
Payer: COMMERCIAL

## 2018-10-19 ENCOUNTER — RECORDS - HEALTHEAST (OUTPATIENT)
Dept: ADMINISTRATIVE | Facility: OTHER | Age: 58
End: 2018-10-19

## 2018-10-19 VITALS
RESPIRATION RATE: 16 BRPM | HEART RATE: 72 BPM | WEIGHT: 136 LBS | DIASTOLIC BLOOD PRESSURE: 75 MMHG | HEIGHT: 59 IN | OXYGEN SATURATION: 97 % | BODY MASS INDEX: 27.42 KG/M2 | SYSTOLIC BLOOD PRESSURE: 125 MMHG | TEMPERATURE: 98.3 F

## 2018-10-19 DIAGNOSIS — Z00.00 HEALTH CARE MAINTENANCE: ICD-10-CM

## 2018-10-19 DIAGNOSIS — R03.0 ELEVATED BLOOD PRESSURE READING WITHOUT DIAGNOSIS OF HYPERTENSION: ICD-10-CM

## 2018-10-19 DIAGNOSIS — R73.03 PRE-DIABETES: Primary | ICD-10-CM

## 2018-10-19 LAB — HBA1C MFR BLD: 5.9 % (ref 4.1–5.7)

## 2018-10-19 NOTE — MR AVS SNAPSHOT
"              After Visit Summary   10/19/2018    Bushra Levy    MRN: [id number]           Patient Information     Date Of Birth          1960        Visit Information        Provider Department      10/19/2018 2:40 PM Ana Levin DO [facility]        Today's Diagnoses     Pre-diabetes    -  1    Elevated blood pressure reading without diagnosis of hypertension        Health care maintenance           Follow-ups after your visit        Follow-up notes from your care team     Return in about 1 year (around 10/19/2019).      Who to contact     Please call your clinic at [phone] to:    Ask questions about your health    Make or cancel appointments    Discuss your medicines    Learn about your test results    Speak to your doctor            Additional Information About Your Visit        Care EveryWhere ID     This is your Care EveryWhere ID. This could be used by other organizations to access your [facility] medical records  [id number]        Your Vitals Were     Pulse Temperature Respirations Height Pulse Oximetry BMI (Body Mass Index)    72 98.3  F (36.8  C) (Oral) 16 4' 11.06\" (150 cm) 97% 27.42 kg/m2       Blood Pressure from Last 3 Encounters:   10/19/18 125/75   12/22/17 134/87   12/08/17 164/90    Weight from Last 3 Encounters:   10/19/18 136 lb (61.7 kg)   12/22/17 137 lb 9.6 oz (62.4 kg)   12/08/17 137 lb (62.1 kg)              We Performed the Following     Hemoglobin A1c ([facility])     MA SCREENING DIGITAL BILAT        Primary Care Provider Office Phone # Fax #    Ana Levin [phone]       [address] [place] [address]        Equal Access to Services     [facility]: Hadii aad ku hadasho Soomaali, waaxda luqadaha, qaybta kaalmada adeegana m bashir . So [facility] [phone].    ATENCIÓN: Si habla español, tiene a ryan disposición servicios gratuitos de asistencia lingüística. Llame al [phone].    We comply with " applicable federal civil rights laws and [place] laws. We do not discriminate on the basis of race, color, national origin, age, disability, sex, sexual orientation, or gender identity.            Thank you!     Thank you for choosing [facility]  for your care. Our goal is always to provide you with excellent care. Hearing back from our patients is one way we can continue to improve our services. Please take a few minutes to complete the written survey that you may receive in the mail after your visit with us. Thank you!             Your Updated Medication List - Protect others around you: Learn how to safely use, store and throw away your medicines at www.disposemymeds.org.          This list is accurate as of 10/19/18  3:59 PM.  Always use your most recent med list.                   Brand Name Dispense Instructions for use Diagnosis    IBUPROFEN PO      Take 2 tablets by mouth 3 times daily        loratadine 10 MG tablet    CLARITIN    90 tablet    Take 1 tablet (10 mg) by mouth daily as needed for allergies    Chronic rhinitis       omeprazole 40 MG capsule    priLOSEC    30 capsule    Take 1 capsule (40 mg) by mouth daily Take 30-60 minutes before a meal.    Epigastric pain       Vitamin D3 2000 units Tabs     90 tablet    Take 1,000 Units by mouth daily    Vitamin D insufficiency

## 2018-10-19 NOTE — PROGRESS NOTES
Preceptor Attestation:   Patient seen, evaluated and discussed with the resident, Dr. Ana Levin.  I have verified the content of the note, which accurately reflects my assessment of the patient and the plan of care.  Supervising Physician:Desiree Corona MD  [facility]

## 2018-10-19 NOTE — PROGRESS NOTES
"       Subjective:   Bushra Levy is a 58 year old year old female who presents to clinic today for the following health issues:    Hypertensive episode:  Prior episode in clinic was elevated (12/22/2017)  Normal here today    Pre-Diabetes:  Checks blood sugars 3 times per week  BG ~120s (not fasting)  Concerned she might develop diabetes    Declined HIV screen  Mammogram- last was in 2016. All have been normal.          PMHX:   PAST MEDICAL HISTORY:  Patient Active Problem List   Diagnosis     Esophageal reflux     Vitamin D insufficiency     Pre-diabetes     Elevated BP     Fatty liver disease, nonalcoholic     Polyp of gallbladder     Pulmonary nodules     Positive FIT (fecal immunochemical test)     Chronic rhinitis     Encounter for screening for cervical cancer      Renal cyst, right     CURRENT MEDICATIONS:  Current Outpatient Prescriptions   Medication Sig Dispense Refill     Cholecalciferol (VITAMIN D3) 2000 UNITS TABS Take 1,000 Units by mouth daily 90 tablet 3     IBUPROFEN PO Take 2 tablets by mouth 3 times daily       loratadine (CLARITIN) 10 MG tablet Take 1 tablet (10 mg) by mouth daily as needed for allergies 90 tablet 1     omeprazole (PRILOSEC) 40 MG capsule Take 1 capsule (40 mg) by mouth daily Take 30-60 minutes before a meal. 30 capsule 1     ALLERGIES:     Allergies   Allergen Reactions     Food      Some food allergies     Nka [No Known Allergies]           Objective:     Vitals:    10/19/18 1454   BP: 125/75   Pulse: 72   Resp: 16   Temp: 98.3  F (36.8  C)   TempSrc: Oral   SpO2: 97%   Weight: 136 lb (61.7 kg)   Height: 4' 11.06\" (150 cm)     Body mass index is 27.42 kg/(m^2).  Results for orders placed or performed in visit on 10/19/18 (from the past 24 hour(s))   Hemoglobin A1c ([facility])   Result Value Ref Range    Hemoglobin A1C 5.9 (H) 4.1 - 5.7 %       General: Alert, well-appearing female in NAD  HEENT: PERRL, moist oral mucus membranes  Pulm: CTA BL, no tachypnea  CV: RRR, no " murmur  Abd: soft, NTND, no masses  Ext: Warm, well perfused, 2+ BL radial pulses, no LE edema  Skin: No rash on limited skin exam  Psych: Mood appropriate to visit content, full affect, rational thought content and process    Assessment and Plan:   1. Pre-diabetes  Hgb A1c stable at 5.9. Recommended patient stop checking blood sugars. Reassurance provided. Encouraged regular exercise and proper diet.  - Hemoglobin A1c ([facility])    2. Elevated blood pressure reading without diagnosis of hypertension  Blood pressure normal today. Reassurance provided.    3. Health care maintenance  Due for mammogram. Patient would like to schedule herself.   - MA SCREENING DIGITAL BILAT      Follow up: 1 year CPE  Options for treatment and follow-up care were reviewed with the patient and/or guardian. Bushrajigar Levy and/or guardian engaged in the decision making process and verbalized understanding of the options discussed and agreed with the final plan.    Ana Levin DO  [facility] Family Medicine Resident    Precepted with: Desiree Corona MD

## 2018-12-05 ENCOUNTER — OFFICE VISIT (OUTPATIENT)
Dept: FAMILY MEDICINE | Facility: CLINIC | Age: 58
End: 2018-12-05
Payer: COMMERCIAL

## 2018-12-05 VITALS
BODY MASS INDEX: 27.84 KG/M2 | SYSTOLIC BLOOD PRESSURE: 143 MMHG | HEART RATE: 83 BPM | OXYGEN SATURATION: 100 % | HEIGHT: 59 IN | TEMPERATURE: 98.9 F | WEIGHT: 138.13 LBS | RESPIRATION RATE: 19 BRPM | DIASTOLIC BLOOD PRESSURE: 87 MMHG

## 2018-12-05 DIAGNOSIS — E55.9 VITAMIN D INSUFFICIENCY: ICD-10-CM

## 2018-12-05 DIAGNOSIS — R10.13 EPIGASTRIC PAIN: ICD-10-CM

## 2018-12-05 DIAGNOSIS — K21.9 GASTROESOPHAGEAL REFLUX DISEASE, ESOPHAGITIS PRESENCE NOT SPECIFIED: Primary | ICD-10-CM

## 2018-12-05 DIAGNOSIS — R19.5 POSITIVE FIT (FECAL IMMUNOCHEMICAL TEST): ICD-10-CM

## 2018-12-05 DIAGNOSIS — F41.9 ANXIETY: ICD-10-CM

## 2018-12-05 DIAGNOSIS — R10.11 ABDOMINAL PAIN, RIGHT UPPER QUADRANT: ICD-10-CM

## 2018-12-05 RX ORDER — OMEPRAZOLE 40 MG/1
40 CAPSULE, DELAYED RELEASE ORAL DAILY
Qty: 30 CAPSULE | Refills: 1 | Status: SHIPPED | OUTPATIENT
Start: 2018-12-05 | End: 2019-09-13 | Stop reason: ALTCHOICE

## 2018-12-05 RX ORDER — CHOLECALCIFEROL (VITAMIN D3) 50 MCG
1000 TABLET ORAL DAILY
Qty: 90 TABLET | Refills: 3 | Status: SHIPPED | OUTPATIENT
Start: 2018-12-05 | End: 2019-09-13

## 2018-12-05 NOTE — PATIENT INSTRUCTIONS
1. All scans have been really good! No concern for anything bad in your abdomen as the cause of your pain.  2. Let's talk about starting a medication for anxiety at your next visit  3. Have your colonoscopy performed

## 2018-12-05 NOTE — PROGRESS NOTES
"     HPI:       Bushra Levy is a 58 year old  female with PMH of chronic abdominal pain, anxiety who presents for evaluation of abdominal pain.    Previous imaging reviewed. Has had prior CT scan and US for abdominal scan. Additionally, she has had EGD. All of these have returned without any obvious abnormalities.     Abdomen pain/GERD:  -Onset: Per patient, and chart review, has been ongoing for years, 6+  -Palliative/provoking: When hungry, RUQ pain is very bad, hard to breathe, tough to take a breath. GERD is worse when she lies down.  -Taken any medications: Omeprazole 40 mg, but she believes that she is in need of a refill. Does not notice a significant difference in symptoms when she takes it  -Quality: RUQ, feels \"hard to the touch,\" also feels like she has \"acid issue,\" which she doesn't know if it is related. The acid issue is in the throat.  -Radiating: Radiating up right side of body  -Other associated features: Shortness of breath with abdominal pain  -Feels like she has to eat all the time. Anything hard or chewy makes it worse.  -Feels like the back of her throat always has a bitter taste to it  -Had positive FIT test, but has been scared to get colonoscopy done    Anxiety:  -Ongoing for over 30 years  -When anxious, gets right-sided headache  -This anxiety is a problem for her on a daily basis  -Only relief from anxiety is when she sleeps, but if she wakes up from sleep, she struggles to get back to sleep because of her anxiety  -Has tried anti-anxiety medications, has been off of them for three years now. Took for a total of 7-8 years. Felt like her anxiety wasn't helped when on a medication.  -Gets short of breath when anxiety comes on    A [employer]  was used for this visit         PMHX:     Patient Active Problem List   Diagnosis     Esophageal reflux     Vitamin D insufficiency     Pre-diabetes     Elevated BP     Fatty liver disease, nonalcoholic     Polyp of gallbladder     Pulmonary " "nodules     Positive FIT (fecal immunochemical test)     Chronic rhinitis     Encounter for screening for cervical cancer      Renal cyst, right       Current Outpatient Prescriptions   Medication Sig Dispense Refill     Cholecalciferol (VITAMIN D3) 2000 UNITS TABS Take 1,000 Units by mouth daily (Patient not taking: Reported on 12/5/2018) 90 tablet 3     IBUPROFEN PO Take 2 tablets by mouth 3 times daily       loratadine (CLARITIN) 10 MG tablet Take 1 tablet (10 mg) by mouth daily as needed for allergies (Patient not taking: Reported on 12/5/2018) 90 tablet 1     omeprazole (PRILOSEC) 40 MG capsule Take 1 capsule (40 mg) by mouth daily Take 30-60 minutes before a meal. (Patient not taking: Reported on 12/5/2018) 30 capsule 1       Social History     Social History     Marital status:      Spouse name: N/A     Number of children: N/A     Years of education: N/A     Occupational History     Not on file.     Social History Main Topics     Smoking status: Never Smoker     Smokeless tobacco: Never Used     Alcohol use No     Drug use: No     Sexual activity: Not Currently     Other Topics Concern     Not on file     Social History Narrative          Allergies   Allergen Reactions     Food      Some food allergies     Nka [No Known Allergies]        No results found for this or any previous visit (from the past 24 hour(s)).         Review of Systems:   GEN: no weight gain/loss  LUNGS: SOB, cough (ongoing for some time/timeline appears chronic but difficult to discern), no wheeze  COR: no chest pain, palpitations, PND  GI: No blood in the stool, but does endorse having a positive FIT test  ABD: RUQ abd pain as above          Physical Exam:     Vitals:    12/05/18 1559   BP: 143/87   BP Location: Right arm   Patient Position: Sitting   Cuff Size: Adult Regular   Pulse: 83   Resp: 19   Temp: 98.9  F (37.2  C)   TempSrc: Oral   SpO2: 100%   Weight: 138 lb 2 oz (62.7 kg)   Height: 4' 11.06\" (150 cm)     Body mass index " is 27.85 kg/(m^2).    GENERAL APPEARANCE: healthy, alert and no distress  EYES: Eyes grossly normal to inspection,  PERRL  HEENT: no cervical lymphadenopathy  NECK: no adenopathy, no asymmetry, masses, or scars and thyroid normal to palpation  RESP: Pleural friction rub on right posterior lung fields in upper and middle lobes. Lungs clear to auscultation in all other lung fields  CV: regular rate and rhythm,  and no murmur, click,  rub or gallop  ABDOMEN: soft, nontender, without hepatosplenomegaly or masses  MS: extremities normal- no gross deformities noted  SKIN: no suspicious lesions or rashes  NEURO: Normal strength and tone, sensory exam grossly normal, mentation appears intact and speech normal  PSYCH: mood and affect normal/bright      Assessment and Plan     1. Gastroesophageal reflux disease  Appears to be related to anxiety some, as discussed below.  -Continue to take Omeprazole 40 mg before meals    2. Abdominal pain, right upper quadrant  History and exam consistent with her chronic abdominal pain. I do think that patient would benefit from medication to help her anxiety, as discussed below. No concern on my exam for acute abdomen. No change in bowel habits, no fever, patient appears comfortable. No need to re-image.     3. Anxiety  Had a very rubina discussion that I think that her anxiety and abdominal pain are likely connected to each other. Discussed that all scans have been negative, pathology reports have been negative for disease. Patient is unwilling to take any medication for her anxiety, even with the knowledge that this will likely help her abdominal pain. Unwilling to take medication now, but this should be suggested again at future visit. She seems willing to trial something in the future, and I think she should be seen frequently to discuss ongoing issues.  -Follow up in one month to discuss anxiety    4. Health Maintenance  -Placed order for colonoscopy    Options for treatment and  follow-up care were reviewed with the patient and/or guardian. Bushrajigar Levy and/or guardian engaged in the decision making process and verbalized understanding of the options discussed and agreed with the final plan.    Justice Cruz MD      Precepted today with: Giacomo Ruiz MD

## 2018-12-05 NOTE — MR AVS SNAPSHOT
After Visit Summary   12/5/2018    Bushra Levy    MRN: [id number]           Patient Information     Date Of Birth          1960        Visit Information        Provider Department      12/5/2018 4:00 PM Justice Cruz MD [facility]        Today's Diagnoses     Gastroesophageal reflux disease, esophagitis presence not specified    -  1    Abdominal pain, right upper quadrant        Anxiety        Epigastric pain        Positive FIT (fecal immunochemical test)        Vitamin D insufficiency          Care Instructions    1. All scans have been really good! No concern for anything bad in your abdomen as the cause of your pain.  2. Let's talk about starting a medication for anxiety at your next visit  3. Have your colonoscopy performed          Follow-ups after your visit        Additional Services     GASTROENTEROLOGY ADULT REF PROCEDURE ONLY       Last Lab Result: Creatinine (mg/dL)       Date                     Value                 12/08/2017               0.6              ----------  Body mass index is 27.85 kg/(m^2).     Needed:  Yes  Language:  Hmong    Patient will be contacted to schedule procedure.     Please be aware that coverage of these services is subject to the terms and limitations of your health insurance plan.  Call member services at your health plan with any benefit or coverage questions.  Any procedures must be performed at a [facility] facility OR coordinated by your clinic's referral office.    Please bring the following with you to your appointment:    (1) Any X-Rays, CTs or MRIs which have been performed.  Contact the facility where they were done to arrange for  prior to your scheduled appointment.    (2) List of current medications   (3) This referral request   (4) Any documents/labs given to you for this referral                  Follow-up notes from your care team     Return in about 1 month (around 1/5/2019).      Who to contact     Please  "call your clinic at [phone] to:    Ask questions about your health    Make or cancel appointments    Discuss your medicines    Learn about your test results    Speak to your doctor            Additional Information About Your Visit        Care EveryWhere ID     This is your Care EveryWhere ID. This could be used by other organizations to access your [facility] medical records  [id number]        Your Vitals Were     Pulse Temperature Respirations Height Pulse Oximetry BMI (Body Mass Index)    83 98.9  F (37.2  C) (Oral) 19 4' 11.06\" (150 cm) 100% 27.85 kg/m2       Blood Pressure from Last 3 Encounters:   12/05/18 143/87   10/19/18 125/75   12/22/17 134/87    Weight from Last 3 Encounters:   12/05/18 138 lb 2 oz (62.7 kg)   10/19/18 136 lb (61.7 kg)   12/22/17 137 lb 9.6 oz (62.4 kg)              We Performed the Following     GASTROENTEROLOGY ADULT REF PROCEDURE ONLY          Where to get your medicines      These medications were sent to [id number] - [place], [place] - [address], [place] [address]     Phone:  [phone]     omeprazole 40 MG DR capsule    Vitamin D3 2000 units Tabs          Primary Care Provider Office Phone # Fax #    Ana Levin  [phone]       [facility] [place] [address]        Equal Access to Services     [facility]: Hadii aad ku hadasho Soaustinali, waaxda luqadaha, qaybta kaalmada mariahyalea, ana m zelaya. So [facility] [phone].    ATENCIÓN: Si habla español, tiene a ryan disposición servicios gratuitos de asistencia lingüística. Llame al [phone].    We comply with applicable federal civil rights laws and [place] laws. We do not discriminate on the basis of race, color, national origin, age, disability, sex, sexual orientation, or gender identity.            Thank you!     Thank you for choosing [facility]  for your " care. Our goal is always to provide you with excellent care. Hearing back from our patients is one way we can continue to improve our services. Please take a few minutes to complete the written survey that you may receive in the mail after your visit with us. Thank you!             Your Updated Medication List - Protect others around you: Learn how to safely use, store and throw away your medicines at www.disposemymeds.org.          This list is accurate as of 12/5/18  5:11 PM.  Always use your most recent med list.                   Brand Name Dispense Instructions for use Diagnosis    IBUPROFEN PO      Take 2 tablets by mouth 3 times daily        loratadine 10 MG tablet    CLARITIN    90 tablet    Take 1 tablet (10 mg) by mouth daily as needed for allergies    Chronic rhinitis       omeprazole 40 MG DR capsule    priLOSEC    30 capsule    Take 1 capsule (40 mg) by mouth daily Take 30-60 minutes before a meal.    Epigastric pain       Vitamin D3 2000 units Tabs     90 tablet    Take 1,000 Units by mouth daily    Vitamin D insufficiency

## 2018-12-05 NOTE — NURSING NOTE
Due to patient being non-English speaking/uses sign language, an  was used for this visit. Only for face-to-face interpretation by an external agency, date and length of interpretation can be found on the scanned worksheet.     name: Juan Bolton  Agency: [employer]  Language: zena   Telephone number: [phone]  Type of interpretation: Face-to-face, spoken  Patient stopped taking all her med for 1 week due to the pain

## 2018-12-06 ASSESSMENT — PATIENT HEALTH QUESTIONNAIRE - PHQ9: SUM OF ALL RESPONSES TO PHQ QUESTIONS 1-9: 7

## 2018-12-06 NOTE — PROGRESS NOTES
Preceptor Attestation:   Patient seen, evaluated and discussed with the resident. I have verified the content of the note, which accurately reflects my assessment of the patient and the plan of care.    Supervising Physician:Giacomo Ruiz MD    [facility]

## 2019-01-09 ENCOUNTER — OFFICE VISIT (OUTPATIENT)
Dept: FAMILY MEDICINE | Facility: CLINIC | Age: 59
End: 2019-01-09
Payer: COMMERCIAL

## 2019-01-09 VITALS
OXYGEN SATURATION: 98 % | BODY MASS INDEX: 28.47 KG/M2 | RESPIRATION RATE: 16 BRPM | WEIGHT: 141.2 LBS | DIASTOLIC BLOOD PRESSURE: 85 MMHG | HEART RATE: 86 BPM | HEIGHT: 59 IN | SYSTOLIC BLOOD PRESSURE: 146 MMHG | TEMPERATURE: 97.7 F

## 2019-01-09 DIAGNOSIS — F41.1 GAD (GENERALIZED ANXIETY DISORDER): Primary | ICD-10-CM

## 2019-01-09 DIAGNOSIS — R73.03 PRE-DIABETES: ICD-10-CM

## 2019-01-09 RX ORDER — CITALOPRAM HYDROBROMIDE 10 MG/1
10 TABLET ORAL DAILY
Qty: 90 TABLET | Refills: 0 | Status: SHIPPED | OUTPATIENT
Start: 2019-01-09 | End: 2019-09-13

## 2019-01-09 ASSESSMENT — ANXIETY QUESTIONNAIRES
GAD7 TOTAL SCORE: 20
2. NOT BEING ABLE TO STOP OR CONTROL WORRYING: NEARLY EVERY DAY
7. FEELING AFRAID AS IF SOMETHING AWFUL MIGHT HAPPEN: NEARLY EVERY DAY
5. BEING SO RESTLESS THAT IT IS HARD TO SIT STILL: NEARLY EVERY DAY
6. BECOMING EASILY ANNOYED OR IRRITABLE: NEARLY EVERY DAY
3. WORRYING TOO MUCH ABOUT DIFFERENT THINGS: NEARLY EVERY DAY
1. FEELING NERVOUS, ANXIOUS, OR ON EDGE: MORE THAN HALF THE DAYS

## 2019-01-09 ASSESSMENT — PATIENT HEALTH QUESTIONNAIRE - PHQ9
5. POOR APPETITE OR OVEREATING: NEARLY EVERY DAY
SUM OF ALL RESPONSES TO PHQ QUESTIONS 1-9: 21

## 2019-01-09 ASSESSMENT — MIFFLIN-ST. JEOR: SCORE: 1136.98

## 2019-01-09 NOTE — PROGRESS NOTES
"       Subjective:   Bushra Levy is a 58 year old year old female who presents to clinic today for the following health issues:    Anxiety   Feeling tired, close to a year. Wakes up throughout the night and is unable to fall back asleep. Goes to bed at 10 pm. Takes an hour or more to fall asleep due to racing thoughts. Unable to wind down. Mother passed this past march. Fear of flying so didnt go to  and feels guilty about this. History of anxiety. When shes anxious she gets headaches, heart is racing, bitter taste in mouth. Has been dealing this for 30 years. Maybe 10 years ago was on anxiety medicine. Had social anxiety at that time. Physically didn't feel good on the medicine, that's why she stopped. Has been hospitalized once for psychiatric reasons about . Was in a \"very dark place.\"     Pre-diabetes  Feeling jittery. Feeling like sugars are low. Checking sugars for 4 years. ~125 BG fasting.    PHQ9= 21  MALA= 20    A [employer]  was used for this visit         PMHX:   PAST MEDICAL HISTORY:  Patient Active Problem List   Diagnosis     Esophageal reflux     Vitamin D insufficiency     Pre-diabetes     Elevated BP     Fatty liver disease, nonalcoholic     Polyp of gallbladder     Pulmonary nodules     Positive FIT (fecal immunochemical test)     Chronic rhinitis     Encounter for screening for cervical cancer      Renal cyst, right     CURRENT MEDICATIONS:  Current Outpatient Medications   Medication Sig Dispense Refill     Cholecalciferol (VITAMIN D3) 2000 units TABS Take 1,000 Units by mouth daily 90 tablet 3     citalopram (CELEXA) 10 MG tablet Take 1 tablet (10 mg) by mouth daily 90 tablet 0     omeprazole (PRILOSEC) 40 MG DR capsule Take 1 capsule (40 mg) by mouth daily Take 30-60 minutes before a meal. 30 capsule 1     ALLERGIES:     Allergies   Allergen Reactions     Food      Some food allergies     Nka [No Known Allergies]           Objective:     Vitals:    19 1433   BP: 146/85 " "  Pulse: 86   Resp: 16   Temp: 97.7  F (36.5  C)   TempSrc: Oral   SpO2: 98%   Weight: 64 kg (141 lb 3.2 oz)   Height: 1.5 m (4' 11.06\")     Body mass index is 28.47 kg/m .  No results found for this or any previous visit (from the past 24 hour(s)).    General: Alert, well-appearing female in NAD  Pulm: CTA BL, no tachypnea  CV: RRR, no murmur  Psych: Mood appropriate to visit content, full affect, rational thought content and process    Assessment and Plan:   1. MALA (generalized anxiety disorder)  Suspect stabilizing anxiety will improve other aspects of life including sleep, social life, physical affects. Interested in psychotherapy in addition to medication.   - MENTAL HEALTH REFERRAL  -  - citalopram (CELEXA) 10 MG tablet; Take 1 tablet (10 mg) by mouth daily  Dispense: 90 tablet; Refill: 0    2. Pre-diabetes  Hgb A1c 5.9 on 10/19/18. Recommended against checking blood sugars due to unnecessary extra worry and cost. Will routinely monitor for progression.     Follow up: 4 weeks  Options for treatment and follow-up care were reviewed with the patient and/or guardian. Bushra Levy and/or guardian engaged in the decision making process and verbalized understanding of the options discussed and agreed with the final plan.    Ana Levin DO  [facility] Family Medicine Resident    Precepted with: Oumar Morillo MD          "

## 2019-01-09 NOTE — NURSING NOTE
Due to patient being non-English speaking/uses sign language, an  was used for this visit. Only for face-to-face interpretation by an external agency, date and length of interpretation can be found on the scanned worksheet.     name: jesse cruz her  Agency: [employer]  Language: Jesus   Telephone number: [phone]  Type of interpretation: Face-to-face, spoken    Declined mammogram   Got flu shot 10/15/18 at pharmacy

## 2019-01-09 NOTE — PATIENT INSTRUCTIONS
"Patient Education   Tips for Sleep Hygiene  \"Sleep hygiene\" means having good sleep habits.Follow these tips to sleep better at night:     Get on a schedule. Go to bed and get up at about the same time every day.    Listen to your body. Only try to sleep when you actually feel tired or sleepy.    Be patient. If you haven't been able to get to sleep after about 30 minutes or more, get up and do something calming or boring until you feel sleepy. Then return to bed and try again.    Don't have caffeine (coffee, tea, cola drinks, chocolate and some medicines), alcohol or nicotine (cigarettes). These can make it harder for you to fall asleep and stay asleep.    Use your bed for sleeping only. That means no TV, computer or homework in bed, especially during the evening and before bedtime.    Don't nap during the day. If you must nap, make sure it is for less than 20 minutes.    Create sleep rituals that remind your body it is time to sleep. Examples include breathing exercises, stretching or reading a book.    Avoid all electronic media (smart phone, computer, tablet) within 2 hours of bed time. The \"blue light\" in these devices activates the part of the brain that keeps you awake.    Dim the lights at night.    Get early morning sources of light (walk in the sunshine) to help set sleep patterns at night.    Try a bath or shower before bed. Having a warm bath 1 to 2 hours before bedtime can help you feel sleepy. Hot baths can make you alert, so be mindful of the temperature.    Don't watch the clock. Checking the clock during the night can wake you up. It can also lead to negative thoughts such as, \"I will never fall asleep,\" which can increase anxiety and sleeplessness.    Use a sleep diary. Track your sleep schedule to know your sleep patterns and to see where you can improve.    Get regular exercise every day. Try not to do heavy exercise in the 4 hours before bedtime.    Eat a healthy, balanced diet.    Try eating a " light, healthy snack before bed, but avoid eating a heavy meal.    Create the right sleeping area. A cool, dark, quiet room is best. If needed, try earplugs, fans and blackout curtains.    Keep your daytime routine the same even if you have a bad night sleep. Avoiding activities the next day can make it harder to sleep.  For informational purposes only. Not to replace the advice of your health care provider.   Copyright   2013 [facility]. All rights reserved. [employer] 383202 - 01/16.

## 2019-01-09 NOTE — PROGRESS NOTES
Preceptor Attestation:   Patient seen, evaluated and discussed with the resident. I have verified the content of the note, which accurately reflects my assessment of the patient and the plan of care.  Supervising Physician:Oumar Morillo MD  [facility]

## 2019-01-10 ASSESSMENT — ANXIETY QUESTIONNAIRES: GAD7 TOTAL SCORE: 20

## 2019-09-13 ENCOUNTER — OFFICE VISIT (OUTPATIENT)
Dept: FAMILY MEDICINE | Facility: CLINIC | Age: 59
End: 2019-09-13
Payer: COMMERCIAL

## 2019-09-13 VITALS
TEMPERATURE: 98.1 F | WEIGHT: 137.6 LBS | BODY MASS INDEX: 27.74 KG/M2 | RESPIRATION RATE: 16 BRPM | OXYGEN SATURATION: 98 % | DIASTOLIC BLOOD PRESSURE: 78 MMHG | HEIGHT: 59 IN | HEART RATE: 76 BPM | SYSTOLIC BLOOD PRESSURE: 115 MMHG

## 2019-09-13 DIAGNOSIS — E55.9 VITAMIN D INSUFFICIENCY: ICD-10-CM

## 2019-09-13 DIAGNOSIS — F41.1 GAD (GENERALIZED ANXIETY DISORDER): ICD-10-CM

## 2019-09-13 DIAGNOSIS — Z12.39 BREAST CANCER SCREENING: ICD-10-CM

## 2019-09-13 DIAGNOSIS — K21.9 GASTROESOPHAGEAL REFLUX DISEASE WITHOUT ESOPHAGITIS: Primary | ICD-10-CM

## 2019-09-13 RX ORDER — CHOLECALCIFEROL (VITAMIN D3) 50 MCG
1000 TABLET ORAL DAILY
Qty: 90 TABLET | Refills: 3 | Status: SHIPPED | OUTPATIENT
Start: 2019-09-13 | End: 2020-10-27

## 2019-09-13 ASSESSMENT — MIFFLIN-ST. JEOR: SCORE: 1122.52

## 2019-09-13 NOTE — NURSING NOTE
Due to patient being non-English speaking/uses sign language, an  was used for this visit. Only for face-to-face interpretation by an external agency, date and length of interpretation can be found on the scanned worksheet.     name: edgar reed  Agency: [employer]  Language: Hmong   Telephone number: [phone]  Type of interpretation: Face-to-face, spoken

## 2019-09-13 NOTE — PATIENT INSTRUCTIONS
Thank you for coming in to be seen today. Please start taking your Vitamin D supplement again.    For your stomach discomfort, I have changed your medication to ranitidine 150 mg twice daily. Please follow up in 4-6 weeks if symptoms not improving.     Referral for :     Mammogram    LOCATION/PLACE/Provider :    [facility]   DATE & TIME :    Location will call patient, referral faxed.   PHONE :     [phone]  FAX :    [phone]  Appointment instructions: No deodorants, scented lotions or perfumes  Appointment made by clinic staff/:    Janine

## 2019-09-13 NOTE — PROGRESS NOTES
"  Subjective:   Bushra Levy is a 59 year old year old female who presents to clinic today for the following health issues:    1. Nausea/Abdominal Pain: Patient has been having right upper quadrant pain for at least the past 10 years. The pain is worst when she is hungry. She endorses associated lightheadedness and like she is going to faint when the pain gets bad. The pain radiates up the right side of her body. She has an area that \"feels hard to touch\" when she presses deeply into her right upper quadrant. She has been taking omeprazole which had been helpful in the past but over the last few weeks she feels that it has been causing her to feel nauseated. She denies weight loss, vomiting, melena, hematochezia, hematemesis, bloating or abdominal fullness. No chest pain, shortness of breath, fevers, or chills.    History of gallbladder polyp; noted on US 2014, repeat US 2017 shows no change in the polyp. She had an EGD 2/13/2018; biopsies of the stomach and duodenum were normal.     2. Anxiety: Reports ongoing issues with her mood over the last 30 years. She feels anxious every time the phone rings as she dreads getting bad news. She is afraid that something will happen to her children. She had tried therapy in the past, it has been at least 10 years since she saw a therapist. Not interested in going back as overall she feels that things are a little better. She has stopped taking the Celexa as she feels like it wasn't helping her. She has started practicing shamanism and feels like her [place] community is supporting her mental health well currently.     PHQ-9 SCORE 12/8/2017 12/6/2018 1/9/2019   PHQ-9 Total Score - - -   PHQ-9 Total Score 11 7 21     A Balakam  was used for this visit    PMHX:     Patient Active Problem List   Diagnosis     Esophageal reflux     Vitamin D insufficiency     Pre-diabetes     Elevated BP     Fatty liver disease, nonalcoholic     Polyp of gallbladder     Pulmonary nodules " ----- Message from Bettina Mascorro sent at 12/6/2017  3:38 PM CST -----  Contact: Patient  Patient request a call back at [phone], Regards to working her in back in.    Thanks  td   "    Positive FIT (fecal immunochemical test)     Chronic rhinitis     Encounter for screening for cervical cancer      Renal cyst, right     Social History     Tobacco Use     Smoking status: Never Smoker     Smokeless tobacco: Never Used   Substance Use Topics     Alcohol use: No     Alcohol/week: 0.0 oz     Drug use: No      Allergies   Allergen Reactions     Food      Some food allergies     Nka [No Known Allergies]      Current Outpatient Medications   Medication Sig Dispense Refill     Cholecalciferol (VITAMIN D3) 2000 units TABS Take 1,000 Units by mouth daily 90 tablet 3     ranitidine (ZANTAC) 150 MG tablet Take 1 tablet (150 mg) by mouth 2 times daily 60 tablet 3     Review of Systems:     Constitutional, HEENT, cardiovascular, pulmonary, GI, musculoskeletal, neuro, skin, and psych systems are negative, except as otherwise noted.     Objective:     /78   Pulse 76   Temp 98.1  F (36.7  C) (Oral)   Resp 16   Ht 1.511 m (4' 11.49\")   Wt 62.4 kg (137 lb 9.6 oz)   SpO2 98%   BMI 27.34 kg/m    Body mass index is 27.34 kg/m .  GENERAL APPEARANCE: healthy, alert and no distress,  EYES: Eyes grossly normal to inspection,  PERRL  RESP: lungs clear to auscultation - no rales, rhonchi or wheezes  CV: regular rate and rhythm,  and no murmur, click,  rub or gallop  ABDOMEN: soft, nontender, without hepatosplenomegaly or masses  MS: extremities normal- no gross deformities noted  SKIN: no suspicious lesions or rashes  NEURO: Normal strength and tone, sensory exam grossly normal, mentation appears intact and speech normal  PSYCH: euthymic mood, affect congruent    Assessment & Plan:     There are no diagnoses linked to this encounter.    1. Gastroesophageal reflux disease without esophagitis  Patient has a longstanding history of chronic abdominal pain with negative imaging studies in the past. No evidence of gastrointestinal bleeding to be concerning for ulcers, no weight changes or bloating that would be " concerning for malignancy. No diarrhea or constipation to suggest irritable bowel or inflammatory bowel diseases. Given patient's reported nausea with omeprazole, will trial patient on ranitidine to see if that provides relief for her symptoms without causing nausea. Patient to follow up in 4-6 weeks as needed if symptoms not improving.  - ranitidine (ZANTAC) 150 MG tablet; Take 1 tablet (150 mg) by mouth 2 times daily  Dispense: 60 tablet; Refill: 3    2. MALA (generalized anxiety disorder)  PHQ-9 score elevated today compared to prior, however patient feels like her mood and anxiety are doing better than they had been at prior visits. Uninterested in therapy or medication at this time. Continue to monitor at future visits.     3. Vitamin D insufficiency  Patient has a documented history of low vitamin D levels in the past. Has not been taking her supplement as she ran out of refills.  - Cholecalciferol (VITAMIN D3) 2000 units TABS; Take 1,000 Units by mouth daily  Dispense: 90 tablet; Refill: 3    4. Breast cancer screening  - MA SCREENING DIGITAL BILAT; Future    Options for treatment and follow-up care were reviewed with the patient and/or guardian. Bushra Levy and/or guardian engaged in the decision making process and verbalized understanding of the options discussed and agreed with the final plan.    Izzy Madden MD  [facility] Medicine Resident    Precepted with: Giacomo Ruiz MD

## 2019-09-16 ENCOUNTER — RECORDS - HEALTHEAST (OUTPATIENT)
Dept: ADMINISTRATIVE | Facility: OTHER | Age: 59
End: 2019-09-16

## 2019-10-01 ENCOUNTER — OFFICE VISIT (OUTPATIENT)
Dept: FAMILY MEDICINE | Facility: CLINIC | Age: 59
End: 2019-10-01
Payer: COMMERCIAL

## 2019-10-01 VITALS
RESPIRATION RATE: 18 BRPM | OXYGEN SATURATION: 97 % | BODY MASS INDEX: 28.3 KG/M2 | SYSTOLIC BLOOD PRESSURE: 149 MMHG | HEIGHT: 59 IN | HEART RATE: 79 BPM | DIASTOLIC BLOOD PRESSURE: 88 MMHG | TEMPERATURE: 98.6 F | WEIGHT: 140.4 LBS

## 2019-10-01 DIAGNOSIS — I10 BENIGN ESSENTIAL HYPERTENSION: ICD-10-CM

## 2019-10-01 DIAGNOSIS — Z00.00 ROUTINE HISTORY AND PHYSICAL EXAMINATION OF ADULT: Primary | ICD-10-CM

## 2019-10-01 DIAGNOSIS — R73.03 PRE-DIABETES: ICD-10-CM

## 2019-10-01 DIAGNOSIS — E55.9 VITAMIN D INSUFFICIENCY: ICD-10-CM

## 2019-10-01 DIAGNOSIS — Z12.31 ENCOUNTER FOR SCREENING MAMMOGRAM FOR BREAST CANCER: ICD-10-CM

## 2019-10-01 LAB
BUN SERPL-MCNC: 14 MG/DL (ref 7–30)
CALCIUM SERPL-MCNC: 9.8 MG/DL (ref 8.5–10.4)
CHLORIDE SERPLBLD-SCNC: 101 MMOL/L (ref 94–109)
CHOLEST SERPL-MCNC: 207 MG/DL
CHOLEST/HDLC SERPL: 3.5 RATIO
CO2 SERPL-SCNC: 28 MMOL/L (ref 20–32)
CREAT SERPL-MCNC: 0.5 MG/DL (ref 0.6–1.3)
EGFR CALCULATED (BLACK REFERENCE): >90 ML/MIN
EGFR CALCULATED (NON BLACK REFERENCE): >90 ML/MIN
GLUCOSE SERPL-MCNC: 106 MG/DL (ref 60–109)
HBA1C MFR BLD: 6 % (ref 4.1–5.7)
HDLC SERPL-MCNC: 59 MG/DL
LDLC SERPL CALC-MCNC: 109 MG/DL (ref 0–99)
POTASSIUM SERPL-SCNC: 3.7 MMOL/L (ref 3.4–5.3)
SODIUM SERPL-SCNC: 143 MMOL/L (ref 133–144)
TRIGL SERPL-MCNC: 196 MG/DL
VLDL-CHOLESTEROL: 39 MG/DL (ref 7–32)

## 2019-10-01 RX ORDER — LISINOPRIL 5 MG/1
5 TABLET ORAL DAILY
Qty: 60 TABLET | Refills: 0 | Status: SHIPPED | OUTPATIENT
Start: 2019-10-01 | End: 2021-01-07

## 2019-10-01 ASSESSMENT — PATIENT HEALTH QUESTIONNAIRE - PHQ9
5. POOR APPETITE OR OVEREATING: NOT AT ALL
SUM OF ALL RESPONSES TO PHQ QUESTIONS 1-9: 9

## 2019-10-01 ASSESSMENT — ANXIETY QUESTIONNAIRES
3. WORRYING TOO MUCH ABOUT DIFFERENT THINGS: NEARLY EVERY DAY
1. FEELING NERVOUS, ANXIOUS, OR ON EDGE: SEVERAL DAYS
2. NOT BEING ABLE TO STOP OR CONTROL WORRYING: SEVERAL DAYS
7. FEELING AFRAID AS IF SOMETHING AWFUL MIGHT HAPPEN: SEVERAL DAYS
6. BECOMING EASILY ANNOYED OR IRRITABLE: SEVERAL DAYS
5. BEING SO RESTLESS THAT IT IS HARD TO SIT STILL: SEVERAL DAYS
GAD7 TOTAL SCORE: 8

## 2019-10-01 ASSESSMENT — MIFFLIN-ST. JEOR: SCORE: 1127.48

## 2019-10-01 NOTE — PROGRESS NOTES
"Chief Complaint   Patient presents with     Ent Problem       Initial /83  Pulse 85  Temp 98.2  F (36.8  C) (Oral)  Ht 5' 4.57\" (1.64 m)  Wt 156 lb 3.2 oz (70.9 kg)  SpO2 97%  BMI 26.34 kg/m2 Estimated body mass index is 26.34 kg/(m^2) as calculated from the following:    Height as of this encounter: 5' 4.57\" (1.64 m).    Weight as of this encounter: 156 lb 3.2 oz (70.9 kg).  Medication Reconciliation: complete     Paris Tomas MA  " Preceptor Attestation:   Patient seen, evaluated and discussed with the resident. I have verified the content of the note, which accurately reflects my assessment of the patient and the plan of care.  Supervising Physician:Frederic Finn MD  [facility]

## 2019-10-01 NOTE — PROGRESS NOTES
Female Physical Note    Concerns today: No special concerns today.    A [employer]  was used for this visit.    ROS:  CONSTITUTIONAL: no fatigue, no unexpected change in weight  SKIN: no worrisome rashes, no worrisome moles, no worrisome lesions  EYES: no acute vision problems or changes  ENT: no ear problems, no mouth problems, no throat problems  RESP: no significant cough, no shortness of breath  CV: no chest pain, no palpitations, no new or worsening peripheral edema  GI: no nausea, no vomiting, no constipation, no diarrhea    Sexually Active: Yes  Sexual concerns: No   Contraception: menopasual    P: 7  Menarche: No LMP recorded. Patient is postmenopausal.  STD History: Neg  Last Pap Smear Date: 2014 normal  Abnormal Pap History: None    Patient Active Problem List   Diagnosis     Esophageal reflux     Vitamin D insufficiency     Pre-diabetes     Elevated BP     Fatty liver disease, nonalcoholic     Polyp of gallbladder     Pulmonary nodules     Positive FIT (fecal immunochemical test)     Chronic rhinitis     Encounter for screening for cervical cancer      Renal cyst, right       Current Outpatient Medications   Medication Sig Dispense Refill     Cholecalciferol (VITAMIN D3) 2000 units TABS Take 1,000 Units by mouth daily 90 tablet 3     lisinopril (PRINIVIL/ZESTRIL) 5 MG tablet Take 1 tablet (5 mg) by mouth daily 60 tablet 0     ranitidine (ZANTAC) 150 MG tablet Take 1 tablet (150 mg) by mouth 2 times daily 60 tablet 3     Past Medical History:   Diagnosis Date     Diabetes (H)      Elevated BP 3/5/2014     Esophageal reflux 2014     Fatty liver disease, nonalcoholic 2014     Lyme disease     diagnosis based on symptoms and erythema migrans. treated      Polyp of gallbladder 2014     Pre-diabetes 3/5/2014     Pulmonary nodules 2015    Noted on CT 12/4/15. Multiple nodules largest 7x4 mm, follow up recommended at 6 and 24 months      Syncope and collapse 2014       "  Family History     Problem (# of Occurrences) Relation (Name,Age of Onset)    Diabetes (1) Mother    Hypertension (1) Other: spouse       Negative family history of: Coronary Artery Disease, Hyperlipidemia, Cerebrovascular Disease, Breast Cancer, Colon Cancer, Prostate Cancer, Other Cancer, Depression, Anxiety Disorder, Substance Abuse, Mental Illness, Anesthesia Reaction, Asthma, Osteoporosis, Genetic Disorder, Thyroid Disease, Obesity          Problem List Medication List and Allergy List were reviewed.    Patient is an established patient of this clinic..    Social History     Tobacco Use     Smoking status: Never Smoker     Smokeless tobacco: Never Used   Substance Use Topics     Alcohol use: No     Alcohol/week: 0.0 standard drinks       Children ? yes 7    Has anyone hurt you physically, for example by pushing, hitting, slapping or kicking you or forcing you to have sex? Denies  Do you feel threatened or controlled by a partner, ex-partner or anyone in your life? Denies    RISK BEHAVIORS AND HEALTHY HABITS:  Tobacco Use/Smoking: None  Illicit Drug Use: None  Do you use alcohol? No  Diet (5-7 servings of fruits/veg daily): Yes   Exercise (30 min accumulated most days):Yes  Dental Care: Yes   Calcium 1500 mg/d:  No  Seat Belt Use: Yes     Cholesterol Level (>46 yo or at risk):  Recommended and patient accepted testing. and Pap/HPV cotest every 5 years for women 30-65   Recommended and patient accepted testing.  Breast CA Screening (>41 yo or 10 y before 1st degree relative diagnosis): Recommended and patient accepted testing.    Immunization History   Administered Date(s) Administered     Influenza Vaccine IM > 6 months Valent IIV4 11/23/2015     Influenza Vaccine, 3 YRS +, IM (QUADRIVALENT W/PRESERVATIVES) 12/22/2016     TDAP Vaccine (Boostrix) 12/21/2015    Reviewed Immunization Record Today    EXAMINATION:   BP (!) 149/88   Pulse 79   Temp 98.6  F (37  C)   Resp 18   Ht 1.499 m (4' 11\")   Wt " 63.7 kg (140 lb 6.4 oz)   SpO2 97%   BMI 28.36 kg/m    GENERAL: healthy, alert and no distress  EYES: Eyes grossly normal to inspection, extraocular movements - intact, and PERRL  HENT: ear canals- normal; TMs- normal; Nose- normal; Mouth- no ulcers, no lesions  NECK: no tenderness, no adenopathy, no asymmetry, no masses, no stiffness; thyroid- normal to palpation  RESP: lungs clear to auscultation - no rales, no rhonchi, no wheezes  BREAST: no masses, no tenderness, no nipple discharge, no palpable axillary masses or adenopathy  CV: regular rates and rhythm, normal S1 S2, no S3 or S4 and no murmur, no click or rub  ABDOMEN: soft, no tenderness, no  hepatosplenomegaly, no masses, normal bowel sounds  MS: extremities- no gross deformities noted, no edema  SKIN: no suspicious lesions, no rashes  NEURO: strength and tone- normal, sensory exam- grossly normal, mentation- intact, speech- normal, reflexes- symmetric  BACK: no CVA tenderness, no paralumbar tenderness  - female: cervix- normal, adnexae- normal; uterus- normal, no masses, no discharge  RECTAL- female: no masses, no hemorrhoids  PSYCH: Alert and oriented times 3; speech- coherent , normal rate and volume; able to articulate logical thoughts, able to abstract reason, no tangential thoughts, no hallucinations or delusions, affect- normal  LYMPHATICS: ant. cervical- normal, post. cervical- normal    ASSESSMENT/PLAN:  1. Routine history and physical examination of adult  Generally healthy 59 year old female. Due for and agreeable for pap smear, mammogram. Unfortunately left before getting PCV23, so will give at follow up visit in 2 weeks. Recommended shingles and flu vaccine at pharmacy.   - GYN Cytology ([facility])    2. Benign essential hypertension  BP elevated in the 140s/80s, and has been in the past. Will begin lisinopril given her pre-diabetes. ASCVD risk of 3.2% so statin/aspirin not indicated.   - lisinopril (PRINIVIL/ZESTRIL) 5 MG tablet; Take 1  tablet (5 mg) by mouth daily  Dispense: 60 tablet; Refill: 0    3. Pre-diabetes  Hgb A1c increased to 6.0%, up from 5.9%. Reiterated importance of diet and exercise.   - Basic Metabolic Panel ([facility])  - Results < 1 hr  - Hemoglobin A1c ([facility])  - Lipid Panel ([facility])    4. Vitamin D insufficiency  Vit D level persistently low, last checked in 2017. Currently on vit D supplementation.   - Vitamin D 25-Hydroxy ([facility])    5. Encounter for screening mammogram for breast cancer  - MA SCREENING DIGITAL BILAT; Future      Follow up in 2 weeks for HTN followup    Ana Levin DO    Precepted with Dr. Finn

## 2019-10-01 NOTE — LETTER
October 8, 2019      Bushra Magallonisael Cam  [address] [place] [address]        Dear Bushra,    Your pap came back as a normal pap smear, we will repeat this in 5 years.     Please see below for your test results.    Resulted Orders   Basic Metabolic Panel ([facility])  - Results < 1 hr   Result Value Ref Range    Glucose 106.0 60.0 - 109.0 mg/dL    Urea Nitrogen 14.0 7.0 - 30.0 mg/dL    Creatinine 0.5 (L) 0.6 - 1.3 mg/dL    Sodium 143.0 133.0 - 144.0 mmol/L    Potassium 3.7 3.4 - 5.3 mmol/L    Chloride 101.0 94.0 - 109.0 mmol/L    Carbon Dioxide 28.0 20.0 - 32.0 mmol/L    Calcium 9.8 8.5 - 10.4 mg/dL    eGFR Calculated (Non Black Reference) >90 >60.0 mL/min    eGFR Calculated (Black Reference) >90 >60.0 mL/min   Hemoglobin A1c ([facility])   Result Value Ref Range    Hemoglobin A1C 6.0 (H) 4.1 - 5.7 %   Lipid Panel ([facility])   Result Value Ref Range    Cholesterol 207.0 (H) <200.0 mg/dL    Triglycerides 196.0 (H) <150.0 mg/dL    HDL Cholesterol 59.0 >50.0 mg/dL      Comment:      If diabetic or CVD then reference range <100    VLDL-Cholesterol 39.0 (H) 7.0 - 32.0 mg/dL    LDL Cholesterol Direct 109.0 (H) 0.0 - 99.0 mg/dL    Cholesterol/HDL Ratio 3.5 <5.0 RATIO   Vitamin D 25-Hydroxy ([facility])   Result Value Ref Range    Vitamin D,25-Hydroxy 29.0 (L) 30.0 - 80.0 ng/mL    Narrative    Test performed by:  [facility] [address] [place] [address]  Deficiency <10.0 ng/mL  Insufficiency 10.0-29.9 ng/mL  Sufficiency 30.0-80.0 ng/mL  Toxicity (possible) >100.0 ng/mL   GYN Cytology ([facility])   Result Value Ref Range    Lab AP Case Report SEE RESULTS BELOW       Comment:      Gynecologic Cytology Report                       Case: [id number]                                     Authorizing Provider:  Frederic Finn MD      Collected:             10/01/2019 1457              Ordering Location:      [facility] Received:              10/02/2019 0815              First Screen:          Mateo  JES Swain                                                                                 (ASCP)                                                                         Specimen:    SUREPATH PAP, SCREENING, Endocervical/cervical                                               Lab AP Gyn Interpretation SEE RESULTS BELOW Negative for squamous intraepithelial le      Comment:      Negative for squamous intraepithelial lesion or malignancy  Electronically signed by Debbie Galindo CT (ASCP) on 10/8/2019 at  1:08 PM      Lab AP Malignant? Normal Normal    Specimen adequacy:       Satisfactory for evaluation, endocerv/transformation zone component absent, atrophy    HPV Reflex? Yes regardless of result     High Risk? No     Last Mens Period 53 yo     Lab AP Abnormal Bleeding No     Lab AP Patient Status N/A     Lab AP Birth Control/Hormones None     Lab AP Previous Normal 12/2014     Lab AP Previous Abnl no     Lab AP Cervical Appearance normal     Narrative    Test performed by:  [facility] [address] [place] [address]   HPV Coffeen PCR ([employer])   Result Value Ref Range    HPV Source SurePath     HPV 16 DNA Negative NEG    HPV 18 DNA Negative NEG    Other HR HPV Negative NEG    Final Diagnosis SEE NOTES       Comment:      This patient's sample is negative for HPV DNA.  This test was developed and its performance characteristics determined by the  [facility]. It  has not been cleared or approved by the FDA. The laboratory is regulated under  CLIA as qualified to perform high-complexity testing. This test is used for  clinical purposes. It should not be regarded as investigational or for  research.  (Note)  METHODOLOGY:  The Roche noni 4800 system uses automated extraction,  simultaneous amplification of HPV (L1 region) and beta-globin,  followed by  real time detection of fluorescent labeled HPV and beta  globin using specific  oligonucleotide probes . The test specifically  identifies types HPV 16 DNA and HPV 18 DNA while concurrently  detecting the rest of the high risk types (31, 33, 35, 39, 45, 51,  52, 56, 58, 59, 66 or 68).     COMMENTS:  This test is not intended for use as a screening device  for women under age 30 with normal cervical   cytology.  Results should  be correlated with cytologic and histologic findings. Close clinical  followup is recommended.         Specimen Description Cervical Cells       Comment:         Performed and/or entered by:  [address] [address]       Narrative    Test performed by:  [facility]  [address], [place] [address]       If you have any questions, please call the clinic to make an appointment.    Sincerely,    Ana Levin, DO

## 2019-10-01 NOTE — NURSING NOTE
Due to patient being non-English speaking/uses sign language, an  was used for this visit. Only for face-to-face interpretation by an external agency, date and length of interpretation can be found on the scanned worksheet.     name: Joanie Cruz  Agency: [employer]  Language: Jessu   Telephone number: [phone]  Type of interpretation: Face-to-face, spoken   Ok to do mammogram

## 2019-10-01 NOTE — PATIENT INSTRUCTIONS
Shingles vaccine at pharmacy  Flu vaccine a couple weeks      Preventive Health Recommendations  Female Ages 50 - 64    Yearly exam: See your health care provider every year in order to  o Review health changes.   o Discuss preventive care.    o Review your medicines if your doctor has prescribed any.      Get a Pap test every three years (unless you have an abnormal result and your provider advises testing more often).    If you get Pap tests with HPV test, you only need to test every 5 years, unless you have an abnormal result.     You do not need a Pap test if your uterus was removed (hysterectomy) and you have not had cancer.    You should be tested each year for STDs (sexually transmitted diseases) if you're at risk.     Have a mammogram every 1 to 2 years.    Have a colonoscopy at age 50, or have a yearly FIT test (stool test). These exams screen for colon cancer.      Have a cholesterol test every 5 years, or more often if advised.    Have a diabetes test (fasting glucose) every three years. If you are at risk for diabetes, you should have this test more often.     If you are at risk for osteoporosis (brittle bone disease), think about having a bone density scan (DEXA).    Shots: Get a flu shot each year. Get a tetanus shot every 10 years.    Nutrition:     Eat at least 5 servings of fruits and vegetables each day.    Eat whole-grain bread, whole-wheat pasta and brown rice instead of white grains and rice.    Get adequate Calcium and Vitamin D.     Lifestyle    Exercise at least 150 minutes a week (30 minutes a day, 5 days a week). This will help you control your weight and prevent disease.    Limit alcohol to one drink per day.    No smoking.     Wear sunscreen to prevent skin cancer.     See your dentist every six months for an exam and cleaning.    See your eye doctor every 1 to 2 years.  Referral for :     Mammogram    LOCATION/PLACE/Provider :    [facility]   DATE & TIME :    Referral faxed, location  will contact patient.   PHONE :     [phone]  FAX :    [phone]  Appointment made by clinic staff/:    Janine  Called [facility] to verify if patient was scheduled, she reported patient was called 3x but was unsuccessful. Patient was not scheduled.

## 2019-10-02 LAB
25(OH)D3 SERPL-MCNC: 29 NG/ML (ref 30–80)
FINAL DIAGNOSIS: NORMAL
HPV 16 DNA: NEGATIVE
HPV 18 DNA: NEGATIVE
HPV SOURCE: NORMAL
OTHER HR HPV: NEGATIVE
SPECIMEN DESCRIPTION: NORMAL

## 2019-10-02 ASSESSMENT — ANXIETY QUESTIONNAIRES: GAD7 TOTAL SCORE: 8

## 2019-10-08 ENCOUNTER — RECORDS - HEALTHEAST (OUTPATIENT)
Dept: ADMINISTRATIVE | Facility: OTHER | Age: 59
End: 2019-10-08

## 2019-10-08 LAB
CYTOLOGY CVX/VAG DOC THIN PREP: NORMAL
HIGH RISK?: NO
HPV REFLEX?: NORMAL
LAB AP ABNORMAL BLEEDING: NO
LAB AP BIRTH CONTROL/HORMONES: NORMAL
LAB AP CERVICAL APPEARANCE: NORMAL
LAB AP PATIENT STATUS: NORMAL
LAB AP PREVIOUS ABNL: NO
LAB AP PREVIOUS NORMAL: NORMAL
LAST MENS PERIOD: NORMAL
PATH REPORT.COMMENTS IMP SPEC: NORMAL
PATH REPORT.COMMENTS IMP SPEC: NORMAL
SPECIMEN ADEQUACY:: NORMAL

## 2019-10-13 NOTE — PROGRESS NOTES
HPI:       Bushra Levy is a 59 year old female with PMH significant for essential hypertension and pre-diabetes who presents for:    1. Follow-up blood pressure  - Started on lisinopril 5 mg daily for HTN during 10/1/19 physical visit (was 149/88). She has been taking the medication everyday. Dry mouth with sour taste that's been going on for a while. Little cough. No dizziness or lightheadedness. Had a question about side effects with lisinopril.    2. Dry mouth  Noticed dry mouth the past couple months. Not sure how much water she is drinking. Chews gum to help with symptoms.          PMHX:     Patient Active Problem List   Diagnosis     Esophageal reflux     Vitamin D insufficiency     Pre-diabetes     Fatty liver disease, nonalcoholic     Polyp of gallbladder     Pulmonary nodules     Positive FIT (fecal immunochemical test)     Chronic rhinitis     Encounter for screening for cervical cancer      Renal cyst, right     Benign essential hypertension     Current Outpatient Medications   Medication Sig Dispense Refill     Cholecalciferol (VITAMIN D3) 2000 units TABS Take 1,000 Units by mouth daily 90 tablet 3     lisinopril (PRINIVIL/ZESTRIL) 5 MG tablet Take 1 tablet (5 mg) by mouth daily 60 tablet 0     ranitidine (ZANTAC) 150 MG tablet Take 1 tablet (150 mg) by mouth 2 times daily 60 tablet 3     Allergies   Allergen Reactions     Food      Some food allergies     Nka [No Known Allergies]        The 10-year ASCVD risk score (Lolis MARIA Jr., et al., 2013) is: 3.4%    Values used to calculate the score:      Age: 59 years      Sex: Female      Is Non- : No      Diabetic: No      Tobacco smoker: No      Systolic Blood Pressure: 132 mmHg      Is BP treated: Yes      HDL Cholesterol: 59 mg/dL      Total Cholesterol: 207 mg/dL    Results for orders placed or performed in visit on 10/14/19 (from the past 24 hour(s))   Basic Metabolic Panel ([facility])  - Results < 1 hr   Result Value Ref  "Range    Glucose 136.0 (H) 60.0 - 109.0 mg/dL    Urea Nitrogen 12.0 7.0 - 30.0 mg/dL    Creatinine 0.6 0.6 - 1.3 mg/dL    Sodium 142.0 133.0 - 144.0 mmol/L    Potassium 3.6 3.4 - 5.3 mmol/L    Chloride 99.0 94.0 - 109.0 mmol/L    Carbon Dioxide 31.0 20.0 - 32.0 mmol/L    Calcium 9.7 8.5 - 10.4 mg/dL    eGFR Calculated (Non Black Reference) >90 >60.0 mL/min    eGFR Calculated (Black Reference) >90 >60.0 mL/min          Physical Exam:     Vitals:    10/14/19 1420   BP: 132/80   Pulse: 83   Resp: 16   Temp: 97.8  F (36.6  C)   TempSrc: Oral   SpO2: 97%   Weight: 64.1 kg (141 lb 6.4 oz)   Height: 1.51 m (4' 11.45\")     Body mass index is 28.13 kg/m .    GENERAL APPEARANCE: healthy, alert and no distress  MOUTH: throat clear. No sores. Normal saliva production.  RESP: lungs clear to auscultation - no rales, rhonchi or wheezes  CV: regular rate and rhythm, and no murmur, click, rub or gallop    Assessment and Plan       1. Benign essential hypertension   Started on lisinopril 5 mg daily for HTN during 10/1/19 physical visit. At goal today with stable electrolytes. Continue current dose.   - Basic Metabolic Panel ([facility])  - Results < 1 hr  - Follow-up in 6 month for blood pressure    2. Dry mouth  Normal oral hygiene, presented prior to starting lisinopril. Recommended increasing oral hydration, sucking on mint or chewing gum to stimulate saliva production.     3. Healthcare maintenance  Immunizations: offered Flu and PCV23 and accepted both.      Options for treatment and follow-up care were reviewed with the patient and/or guardian. Bushrajigar Levy and/or guardian engaged in the decision making process and verbalized understanding of the options discussed and agreed with the final plan.    Lily Villa, MS3    I was present with the medical student who participated in the service and in the documentation of this note. I have verified the history and personally performed the physical exam and medical decision " making, and have verified the content of the note, which accurately reflects my assessment of the patient and the plan of care.   Ana Levin DO      Precepted today with: Dr. Sheldon

## 2019-10-14 ENCOUNTER — OFFICE VISIT (OUTPATIENT)
Dept: FAMILY MEDICINE | Facility: CLINIC | Age: 59
End: 2019-10-14
Payer: COMMERCIAL

## 2019-10-14 VITALS
HEIGHT: 59 IN | SYSTOLIC BLOOD PRESSURE: 132 MMHG | DIASTOLIC BLOOD PRESSURE: 80 MMHG | TEMPERATURE: 97.8 F | OXYGEN SATURATION: 97 % | HEART RATE: 83 BPM | WEIGHT: 141.4 LBS | RESPIRATION RATE: 16 BRPM | BODY MASS INDEX: 28.51 KG/M2

## 2019-10-14 DIAGNOSIS — Z23 NEED FOR PROPHYLACTIC VACCINATION AND INOCULATION AGAINST INFLUENZA: ICD-10-CM

## 2019-10-14 DIAGNOSIS — Z00.00 HEALTHCARE MAINTENANCE: ICD-10-CM

## 2019-10-14 DIAGNOSIS — I10 BENIGN ESSENTIAL HYPERTENSION: Primary | ICD-10-CM

## 2019-10-14 DIAGNOSIS — R68.2 DRY MOUTH: ICD-10-CM

## 2019-10-14 LAB
BUN SERPL-MCNC: 12 MG/DL (ref 7–30)
CALCIUM SERPL-MCNC: 9.7 MG/DL (ref 8.5–10.4)
CHLORIDE SERPLBLD-SCNC: 99 MMOL/L (ref 94–109)
CO2 SERPL-SCNC: 31 MMOL/L (ref 20–32)
CREAT SERPL-MCNC: 0.6 MG/DL (ref 0.6–1.3)
EGFR CALCULATED (BLACK REFERENCE): >90 ML/MIN
EGFR CALCULATED (NON BLACK REFERENCE): >90 ML/MIN
GLUCOSE SERPL-MCNC: 136 MG/DL (ref 60–109)
POTASSIUM SERPL-SCNC: 3.6 MMOL/L (ref 3.4–5.3)
SODIUM SERPL-SCNC: 142 MMOL/L (ref 133–144)

## 2019-10-14 ASSESSMENT — MIFFLIN-ST. JEOR: SCORE: 1139.14

## 2019-10-14 NOTE — PATIENT INSTRUCTIONS
Referral for :     Mammogram    LOCATION/PLACE/Provider :    [facility]   DATE & TIME :    Nov. 26th at 2pm  PHONE :   [phone]  FAX :    [phone]  Appointment made by clinic staff/:    Janine

## 2019-10-14 NOTE — PROGRESS NOTES
Preceptor Attestation:   Patient seen, evaluated and discussed with the resident. I have verified the content of the note, which accurately reflects my assessment of the patient and the plan of care.   Supervising Physician:  Giacomo Sheldon MD

## 2019-10-14 NOTE — LETTER
October 14, 2019      Bushra Jani Cam  [address] [place] [address]        Dear Bushra,  You had a normal electrolytes and kidney function. Thank you  Please see below for your test results.    Resulted Orders   Basic Metabolic Panel ([facility])  - Results < 1 hr   Result Value Ref Range    Glucose 136.0 (H) 60.0 - 109.0 mg/dL    Urea Nitrogen 12.0 7.0 - 30.0 mg/dL    Creatinine 0.6 0.6 - 1.3 mg/dL    Sodium 142.0 133.0 - 144.0 mmol/L    Potassium 3.6 3.4 - 5.3 mmol/L    Chloride 99.0 94.0 - 109.0 mmol/L    Carbon Dioxide 31.0 20.0 - 32.0 mmol/L    Calcium 9.7 8.5 - 10.4 mg/dL    eGFR Calculated (Non Black Reference) >90 >60.0 mL/min    eGFR Calculated (Black Reference) >90 >60.0 mL/min       If you have any questions, please call the clinic to make an appointment.    Sincerely,    Ana Levin, DO

## 2019-11-26 ENCOUNTER — HOSPITAL ENCOUNTER (OUTPATIENT)
Dept: MAMMOGRAPHY | Facility: CLINIC | Age: 59
Discharge: HOME | End: 2019-11-26
Attending: FAMILY MEDICINE

## 2019-11-26 DIAGNOSIS — Z12.31 VISIT FOR SCREENING MAMMOGRAM: ICD-10-CM

## 2020-04-17 DIAGNOSIS — R73.03 PRE-DIABETES: Primary | ICD-10-CM

## 2020-04-17 NOTE — TELEPHONE ENCOUNTER
I spoke to patient, seems that her glucose testing/supplies was discontinued.  Per pt she still test her glucose every so often when she feels that it might be high or low.  She was wondering if she can get refill on strip to use PRN.  Her glucose been running a wide ranges of 100s-170s depending per pt, never over 200 she said. Her machine is One Touch Ultra 2. CXiong, RMA

## 2020-04-17 NOTE — TELEPHONE ENCOUNTER
[facility] phone call message- patient requesting a refill:    Full Medication Name: testing strips    Dose:     Pharmacy confirmed as   [id number] - [place], [place] - [address] AT [facility] [address] [place] [address]  Phone: [phone] Fax: [phone]  : Yes    Additional Comments: Patient wants to get some testing strips for blood sugar     OK to leave a message on voice mail? Yes    Primary language: Hmong      needed? Yes    Call taken on April 17, 2020 at 2:10 PM by Mariano Bolton

## 2020-10-27 ENCOUNTER — OFFICE VISIT (OUTPATIENT)
Dept: FAMILY MEDICINE | Facility: CLINIC | Age: 60
End: 2020-10-27
Payer: COMMERCIAL

## 2020-10-27 VITALS
DIASTOLIC BLOOD PRESSURE: 85 MMHG | HEIGHT: 59 IN | WEIGHT: 139 LBS | OXYGEN SATURATION: 96 % | SYSTOLIC BLOOD PRESSURE: 134 MMHG | BODY MASS INDEX: 28.02 KG/M2 | TEMPERATURE: 98.6 F | RESPIRATION RATE: 16 BRPM | HEART RATE: 85 BPM

## 2020-10-27 DIAGNOSIS — R10.13 EPIGASTRIC PAIN: ICD-10-CM

## 2020-10-27 DIAGNOSIS — E55.9 VITAMIN D INSUFFICIENCY: ICD-10-CM

## 2020-10-27 DIAGNOSIS — J31.0 CHRONIC RHINITIS: ICD-10-CM

## 2020-10-27 DIAGNOSIS — R73.03 PRE-DIABETES: Primary | ICD-10-CM

## 2020-10-27 LAB — HBA1C MFR BLD: 5.7 % (ref 4.1–5.7)

## 2020-10-27 PROCEDURE — 36415 COLL VENOUS BLD VENIPUNCTURE: CPT | Performed by: FAMILY MEDICINE

## 2020-10-27 PROCEDURE — 83036 HEMOGLOBIN GLYCOSYLATED A1C: CPT | Performed by: FAMILY MEDICINE

## 2020-10-27 PROCEDURE — 99214 OFFICE O/P EST MOD 30 MIN: CPT | Performed by: FAMILY MEDICINE

## 2020-10-27 RX ORDER — OMEPRAZOLE 40 MG/1
40 CAPSULE, DELAYED RELEASE ORAL DAILY
Qty: 30 CAPSULE | Refills: 1 | Status: SHIPPED | OUTPATIENT
Start: 2020-10-27 | End: 2020-12-28

## 2020-10-27 RX ORDER — CHOLECALCIFEROL (VITAMIN D3) 50 MCG
1000 TABLET ORAL DAILY
Qty: 90 TABLET | Refills: 3 | Status: SHIPPED | OUTPATIENT
Start: 2020-10-27

## 2020-10-27 RX ORDER — INFLUENZA A VIRUS A/VICTORIA/2454/2019 IVR-207 (H1N1) ANTIGEN (PROPIOLACTONE INACTIVATED), INFLUENZA A VIRUS A/HONG KONG/2671/2019 IVR-208 (H3N2) ANTIGEN (PROPIOLACTONE INACTIVATED), INFLUENZA B VIRUS B/VICTORIA/705/2018 BVR-11 ANTIGEN (PROPIOLACTONE INACTIVATED), INFLUENZA B VIRUS B/PHUKET/3073/2013 BVR-1B ANTIGEN (PROPIOLACTONE INACTIVATED) 15; 15; 15; 15 UG/.5ML; UG/.5ML; UG/.5ML; UG/.5ML
INJECTION, SUSPENSION INTRAMUSCULAR
COMMUNITY
Start: 2020-10-14

## 2020-10-27 RX ORDER — LORATADINE 10 MG/1
10 TABLET ORAL DAILY PRN
Qty: 90 TABLET | Refills: 1 | Status: SHIPPED | OUTPATIENT
Start: 2020-10-27

## 2020-10-27 ASSESSMENT — MIFFLIN-ST. JEOR: SCORE: 1120.13

## 2020-10-27 NOTE — PROGRESS NOTES
SUBJECTIVE:            Bushra Levy is a 60-year-old female with past medical history significant for HTN and esophageal reflux who presents for medication refill.     Esophageal reflux:  - Omeprazole 40 mg PRN  - She states that she only takes the medication as needed about 3x per week.   - She feels that the medication controls her symptoms well.   - Triggers are spicy foods.   - She denies abdominal pain, nausea, vomiting, or diarrhea.     Vitamin D Deficiency  - Takes 2k units daily   - Denies diffuse muscle pain and fatigue    Seasonal allergies  - Reports she has allergies all year-round   - Symptoms are sneezing, runny nose, itchy/watery eyes   - Unsure of what medication she takes   - Used to be on Claritin for allergies five years ago per chart review  - No recent fevers, chills, cough, CP, or SOB    HTN:  - Lisinopril 5 mg daily   - Only takes it when she feels that her BP is high   - Focuses on daily exercise and eating healthy  - Does not need refill  - Denies headache, vision changes, CP, SOB, leg swelling     Health maintenance:  - Already has flu shot from [facility] [employer]   - Due for A1c check and open to that today. Last checked 7/14 at 6.0%. Not on medication.     A Etherpad  was used for this visit  ---------------------------------------------------------------------------------------------------------------------  Patient Active Problem List   Diagnosis     Esophageal reflux     Vitamin D insufficiency     Pre-diabetes     Fatty liver disease, nonalcoholic     Polyp of gallbladder     Pulmonary nodules     Positive FIT (fecal immunochemical test)     Chronic rhinitis     Encounter for screening for cervical cancer      Renal cyst, right     Benign essential hypertension     No past surgical history on file.    Social History     Tobacco Use     Smoking status: Never Smoker     Smokeless tobacco: Never Used   Substance Use Topics     Alcohol use: No     Alcohol/week: 0.0 standard drinks     Family  "History   Problem Relation Age of Onset     Hypertension Other         spouse     Diabetes Mother      Coronary Artery Disease No family hx of      Hyperlipidemia No family hx of      Cerebrovascular Disease No family hx of      Breast Cancer No family hx of      Colon Cancer No family hx of      Prostate Cancer No family hx of      Other Cancer No family hx of      Depression No family hx of      Anxiety Disorder No family hx of      Substance Abuse No family hx of      Mental Illness No family hx of      Anesthesia Reaction No family hx of      Asthma No family hx of      Osteoporosis No family hx of      Genetic Disorder No family hx of      Thyroid Disease No family hx of      Obesity No family hx of          ROS:  Constitutional, HEENT, cardiovascular, pulmonary, gi and gu systems are negative, except as otherwise noted.    Problem list, Medication list, Allergies, and Medical/Social/Surgical histories reviewed in [facility] and updated as appropriate.    OBJECTIVE:                          /85   Pulse 85   Temp 98.6  F (37  C) (Oral)   Resp 16   Ht 1.505 m (4' 11.25\")   Wt 63 kg (139 lb)   SpO2 96%   BMI 27.84 kg/m     GENERAL: healthy, alert, well nourished, well hydrated, no distress  RESP: lungs clear to auscultation - no rales, no rhonchi, no wheezes  CV: regular rates and rhythm, normal S1 S2, no S3 or S4 and no murmur, no click or rub -  ABDOMEN: soft, no tenderness, no  hepatosplenomegaly, no masses, normal bowel sounds  PSYCH: Alert and oriented times 3; speech- coherent , normal rate and volume; able to articulate logical thoughts, able to abstract reason, no tangential thoughts, no hallucinations or delusions, affect- normal     ASSESSMENT/PLAN:            (R73.03) Pre-diabetes  (primary encounter diagnosis): Previously elevated to pre-diabetic range. Normal A1c today at 5.7%. Patient will be contacted with results. Recheck in 1 year.    (J31.0) Chronic rhinitis: Claritin refill provided.   - " Continue Claritin for allergy symptoms.    (E55.9) Vitamin D insufficiency: Supplement refilled.  - Continue Vitamin D 2k units daily    (R10.13) Epigastric pain: Esophageal reflux using Omeprazole 3x weekly. Refill provided.   - Continue using Omeprazole 40 mg PRN    Risks, benefits and alternatives of treatments discussed. Plan agreed on.      Follow-up: Return as needed for medication refill or new/worsening concerns.    Will call, return to clinic, or go to ED if worsening or symptoms not improving as discussed.    Kaity Hope, MS3    In supervising the medical student, I saw and evaluated the patient with the student and personally performed all aspects of the history and physical.  I have reviewed and verified that the documentation is correct and complete.    Rmima Augustin MD

## 2020-10-27 NOTE — NURSING NOTE
"Chief Complaint   Patient presents with     Refill Request     Omeprazole, Vitamin D and Allergy meds.      Medication Reconciliation     completed.        BP (!) 149/89 (BP Location: Right arm, Patient Position: Sitting, Cuff Size: Adult Regular)   Pulse 85   Temp 98.6  F (37  C) (Oral)   Resp 16   Ht 1.505 m (4' 11.25\")   Wt 63 kg (139 lb)   SpO2 96%   BMI 27.84 kg/m      BP Recheck if applicable: 134/85      ~ Viktor Bolton CMA ([place])  [facility]  Phone: [phone]      Due to patient being non-English speaking/uses sign language, an  was used for this visit. Only for face-to-face interpretation by an external agency, date and length of interpretation can be found on the scanned worksheet.     name: Angelica  Agency: Camelia Bernardo  Language: [facility]   Telephone number: [phone]  Type of interpretation: Telephone, spoken    "

## 2020-10-27 NOTE — RESULT ENCOUNTER NOTE
Call patient:    Normal sugar testing today.  No sign of early or pre-diabetes.  Recheck in one year.

## 2020-12-17 ENCOUNTER — OFFICE VISIT (OUTPATIENT)
Dept: FAMILY MEDICINE | Facility: CLINIC | Age: 60
End: 2020-12-17
Payer: COMMERCIAL

## 2020-12-17 ENCOUNTER — ANCILLARY PROCEDURE (OUTPATIENT)
Dept: ULTRASOUND IMAGING | Facility: CLINIC | Age: 60
End: 2020-12-17
Attending: FAMILY MEDICINE
Payer: COMMERCIAL

## 2020-12-17 VITALS
WEIGHT: 140 LBS | HEIGHT: 59 IN | OXYGEN SATURATION: 96 % | RESPIRATION RATE: 16 BRPM | SYSTOLIC BLOOD PRESSURE: 139 MMHG | BODY MASS INDEX: 28.22 KG/M2 | HEART RATE: 82 BPM | DIASTOLIC BLOOD PRESSURE: 83 MMHG | TEMPERATURE: 98.8 F

## 2020-12-17 DIAGNOSIS — R30.0 DYSURIA: Primary | ICD-10-CM

## 2020-12-17 DIAGNOSIS — R10.2 PELVIC PAIN IN FEMALE: ICD-10-CM

## 2020-12-17 LAB
BILIRUBIN UR: NEGATIVE MG/DL
BLOOD UR: NEGATIVE MG/DL
CLARITY, URINE: CLEAR
COLOR UR: YELLOW
GLUCOSE URINE: NEGATIVE
KETONES UR QL: NEGATIVE MG/DL
LEUKOCYTE ESTERASE UR: NEGATIVE
NITRITE UR QL STRIP: NEGATIVE MG/DL
PH UR STRIP: 6.5 [PH] (ref 4.5–8)
PROTEIN UR: NEGATIVE MG/DL
SP GR UR STRIP: <=1.005 (ref 1–1.03)
UROBILINOGEN UR STRIP-ACNC: NORMAL E.U./DL

## 2020-12-17 PROCEDURE — 81003 URINALYSIS AUTO W/O SCOPE: CPT | Performed by: STUDENT IN AN ORGANIZED HEALTH CARE EDUCATION/TRAINING PROGRAM

## 2020-12-17 PROCEDURE — 99213 OFFICE O/P EST LOW 20 MIN: CPT | Mod: GC | Performed by: STUDENT IN AN ORGANIZED HEALTH CARE EDUCATION/TRAINING PROGRAM

## 2020-12-17 ASSESSMENT — MIFFLIN-ST. JEOR: SCORE: 1124.67

## 2020-12-17 NOTE — PROGRESS NOTES
Subjective:   Bushra Levy is a 60 year old year old female who presents to clinic today for the following health issues:    ABDOMINAL PAIN - Low pelvis, right sided pain     Onset: 1 month    Description:   Character: Cramping  Location: pelvic region  Radiation: warm feeling in vagina    Intensity: mild    Progression of Symptoms:  intermittent    Accompanying Signs & Symptoms:  Fever/Chills?: no   Gas/Bloating: no   Nausea: YES  Vomitting: no   Diarrhea?: no   Constipation:no   Dysuria: YES - burning pain with urination  Vaginal discharge: no  Unexplained changes in weight: no   History:   Trauma: no   Previous similar pain: no    Previous tests done: none    Precipitating factors:   Does the pain change with:     Food: no    BM: no     Urination: no     Straining with sneezing      Alleviating factors: sleep    Therapies Tried and outcome: No    LMP: Warm feeling in vaginal area, like she is going to start her menstrual cycle but is postmenopausal. Goes away on its own. No vaginal bleeding.     Last pap 10/1/2019 normal cytology, HPV negative     A InRadio  was used for this visit    PMHX:     Patient Active Problem List   Diagnosis     Esophageal reflux     Vitamin D insufficiency     Pre-diabetes     Fatty liver disease, nonalcoholic     Polyp of gallbladder     Pulmonary nodules     Positive FIT (fecal immunochemical test)     Chronic rhinitis     Encounter for screening for cervical cancer      Renal cyst, right     Benign essential hypertension       Current Outpatient Medications   Medication Sig Dispense Refill     AFLURIA QUADRIVALENT 0.5 ML injection ADM 0.5ML IM UTD       blood glucose (NO BRAND SPECIFIED) test strip Use to test blood sugar 1 times daily or as directed. 100 each 11     Cholecalciferol (VITAMIN D3) 50 MCG (2000 UT) TABS Take 1,000 Units by mouth daily 90 tablet 3     lisinopril (PRINIVIL/ZESTRIL) 5 MG tablet Take 1 tablet (5 mg) by mouth daily 60 tablet 0     loratadine  "(CLARITIN) 10 MG tablet Take 1 tablet (10 mg) by mouth daily as needed for allergies 90 tablet 1     omeprazole (PRILOSEC) 40 MG DR capsule Take 1 capsule (40 mg) by mouth daily Take 30-60 minutes before a meal. 30 capsule 1     Social History     Tobacco Use     Smoking status: Never Smoker     Smokeless tobacco: Never Used   Substance Use Topics     Alcohol use: No     Alcohol/week: 0.0 standard drinks     Drug use: No     Allergies   Allergen Reactions     Food      Some food allergies     Nka [No Known Allergies]        Review of Systems:     Constitutional, HEENT, cardiovascular, pulmonary, GI, musculoskeletal, neuro, skin, and psych systems are negative, except as otherwise noted.     Objective:     /83 (BP Location: Right arm)   Pulse 82   Temp 98.8  F (37.1  C) (Oral)   Resp 16   Ht 1.505 m (4' 11.25\")   Wt 63.5 kg (140 lb)   SpO2 96%   BMI 28.04 kg/m    Body mass index is 28.04 kg/m .  GENERAL APPEARANCE: healthy, alert and no distress  EYES: Eyes grossly normal to inspection  RESP: lungs clear to auscultation - no rales, rhonchi or wheezes  CV: regular rate and rhythm,  and no murmur, click,  rub or gallop  ABDOMEN: soft, nontender, without hepatosplenomegaly or masses  MS: extremities normal- no gross deformities noted  SKIN: no suspicious lesions or rashes on exposed skin  NEURO: Normal strength and tone, sensory exam grossly normal, mentation appears intact and speech normal  PSYCH: mood and affect normal/bright    Diagnostic Test Results:  Results for orders placed or performed in visit on 12/17/20 (from the past 24 hour(s))   Urinalysis, Micro If ([facility])   Result Value Ref Range    Specific Gravity Urine <=1.005 1.005 - 1.030    pH Urine 6.5 4.5 - 8.0    Leukocyte Esterase UR Negative NEGATIVE    Nitrite Urine Negative NEGATIVE mg/dL    Protein UR Negative NEGATIVE mg/dL    Glucose Urine Negative NEGATIVE    Ketones Urine Negative NEGATIVE mg/dL    Urobilinogen mg/dL 0.2 E.U./dL 0.2 " E.U./dL E.U./dL    Bilirubin UR Negative NEGATIVE mg/dL    Blood UR Negative NEGATIVE mg/dL    Color Urine Yellow     Clarity, urine Clear        Assessment & Plan:     1. Dysuria  2. Pelvic pain in female  1 month of right lower quadrant pelvic pain with associated dysuria. Urinalysis not suggestive of infection. No palpable masses on abdominal exam however somewhat limited exam due to body habitus. Will obtain pelvic ultrasound to evaluate for possible ovarian or uterine pathology. Follow up to be determined based on results of ultrasound.  - Urinalysis, Micro If ([facility] )  - US Pelvic Complete with Transvaginal; Future      Options for treatment and follow-up care were reviewed with the patient and/or guardian. Bushra Levy and/or guardian engaged in the decision making process and verbalized understanding of the options discussed and agreed with the final plan.    Izzy Madden MD  [facility] Medicine Resident    Precepted with: Mohan Edward MD

## 2020-12-17 NOTE — NURSING NOTE
Due to patient being non-English speaking/uses sign language, an  was used for this visit. Only for face-to-face interpretation by an external agency, date and length of interpretation can be found on the scanned worksheet.     name: Mariposa #[id number]  Agency: [employer]  Language: Apple[employer]   Telephone number: .  Type of interpretation: Telemedicine, spoken

## 2020-12-17 NOTE — PROGRESS NOTES
Preceptor Attestation: Patient seen, evaluated and discussed with the resident. Dr Madden. I have verified the content of the note, which accurately reflects my assessment of the patient and the plan of care.  Supervising Physician:Mohan Edward MD  [facility]

## 2020-12-17 NOTE — NURSING NOTE
Due to patient being non-English speaking/uses sign language, an  was used for this visit. Only for face-to-face interpretation by an external agency, date and length of interpretation can be found on the scanned worksheet.     name: emmett [id number]  Agency: [employer] - [employer] ([employer] only)  Language: [facility]   Telephone number:   Type of interpretation: Telemedicine, spoken      Due to patient being non-English speaking/uses sign language, an  was used for this visit. Only for face-to-face interpretation by an external agency, date and length of interpretation can be found on the scanned worksheet.     name: obdulia [id number]  Agency: [employer] ([employer] only)  Language: [facility]   Telephone number:   Type of interpretation: Telemedicine, spoken

## 2020-12-28 DIAGNOSIS — R10.13 EPIGASTRIC PAIN: ICD-10-CM

## 2020-12-29 RX ORDER — OMEPRAZOLE 40 MG/1
40 CAPSULE, DELAYED RELEASE ORAL DAILY
Qty: 30 CAPSULE | Refills: 1 | Status: SHIPPED | OUTPATIENT
Start: 2020-12-29 | End: 2021-01-07

## 2020-12-30 ENCOUNTER — TELEPHONE (OUTPATIENT)
Dept: FAMILY MEDICINE | Facility: CLINIC | Age: 60
End: 2020-12-30

## 2021-01-07 ENCOUNTER — OFFICE VISIT (OUTPATIENT)
Dept: FAMILY MEDICINE | Facility: CLINIC | Age: 61
End: 2021-01-07
Payer: COMMERCIAL

## 2021-01-07 VITALS
TEMPERATURE: 98.2 F | RESPIRATION RATE: 16 BRPM | WEIGHT: 143.6 LBS | HEIGHT: 59 IN | DIASTOLIC BLOOD PRESSURE: 94 MMHG | SYSTOLIC BLOOD PRESSURE: 176 MMHG | HEART RATE: 98 BPM | BODY MASS INDEX: 28.95 KG/M2 | OXYGEN SATURATION: 99 %

## 2021-01-07 DIAGNOSIS — K21.9 GASTROESOPHAGEAL REFLUX DISEASE WITHOUT ESOPHAGITIS: Primary | ICD-10-CM

## 2021-01-07 DIAGNOSIS — R10.13 EPIGASTRIC PAIN: ICD-10-CM

## 2021-01-07 DIAGNOSIS — I10 BENIGN ESSENTIAL HYPERTENSION: ICD-10-CM

## 2021-01-07 PROCEDURE — 99213 OFFICE O/P EST LOW 20 MIN: CPT | Mod: GC | Performed by: STUDENT IN AN ORGANIZED HEALTH CARE EDUCATION/TRAINING PROGRAM

## 2021-01-07 RX ORDER — OMEPRAZOLE 40 MG/1
40 CAPSULE, DELAYED RELEASE ORAL DAILY
Qty: 30 CAPSULE | Refills: 3 | Status: SHIPPED | OUTPATIENT
Start: 2021-01-07

## 2021-01-07 RX ORDER — LISINOPRIL 5 MG/1
10 TABLET ORAL DAILY
Qty: 60 TABLET | Refills: 3 | Status: SHIPPED | OUTPATIENT
Start: 2021-01-07

## 2021-01-07 ASSESSMENT — MIFFLIN-ST. JEOR: SCORE: 1141

## 2021-01-07 NOTE — PROGRESS NOTES
HPI:       Bushra Levy is a 60 year old  female with a significant past medical history of esophageal reflux who presents for follow up of concern(s) listed below    Esophageal reflux  - Patient is currently on 40 mg omeprazole daily  - She has been on this medication for many years  - Her symptoms have still persisted, she describes a burning sensation, she also has chronic right upper quadrant pain which has been worked up in the past  - She has had H pylori testing in past, which was negative     HTN  - Patient on 5 mg lisinopril daily   - Her blood pressure was 176/94   - Patient takes the lisinopril in the morning  - She will rarely miss doses, she missed one dose this week  - Patient states she exercises at home, a couple times a week, on a exercise bike  -She takes her BP at home and her BP is often around 140/80s    A [employer]  was used for this visit         History:     Patient Active Problem List   Diagnosis     Esophageal reflux     Vitamin D insufficiency     Pre-diabetes     Fatty liver disease, nonalcoholic     Polyp of gallbladder     Pulmonary nodules     Positive FIT (fecal immunochemical test)     Chronic rhinitis     Encounter for screening for cervical cancer      Renal cyst, right     Benign essential hypertension       Current Outpatient Medications   Medication Sig Dispense Refill     blood glucose (NO BRAND SPECIFIED) test strip Use to test blood sugar 1 times daily or as directed. 100 each 11     Cholecalciferol (VITAMIN D3) 50 MCG (2000 UT) TABS Take 1,000 Units by mouth daily 90 tablet 3     lisinopril (PRINIVIL/ZESTRIL) 5 MG tablet Take 1 tablet (5 mg) by mouth daily 60 tablet 0     loratadine (CLARITIN) 10 MG tablet Take 1 tablet (10 mg) by mouth daily as needed for allergies 90 tablet 1     omeprazole (PRILOSEC) 40 MG DR capsule Take 1 capsule (40 mg) by mouth daily Take 30-60 minutes before a meal. 30 capsule 1     AFLURIA QUADRIVALENT 0.5 ML injection ADM 0.5ML IM  "UTD         Social History     Social History Narrative     Not on file           Allergies   Allergen Reactions     Food      Some food allergies     Nka [No Known Allergies]        No results found for this or any previous visit (from the past 24 hour(s)).         Review of Systems:     10 point ROS neg other than the symptoms noted above in the HPI.          Physical Exam:     Vitals:    01/07/21 1324   Weight: 65.1 kg (143 lb 9.6 oz)   Height: 1.505 m (4' 11.25\")     Body mass index is 28.76 kg/m .    GENERAL APPEARANCE: healthy, alert and no distress,  EYES: Eyes grossly normal to inspection  RESP: lungs clear to auscultation - no rales, rhonchi or wheezes  CV: regular rate and rhythm,  and no murmur, click,  rub or gallop  ABDOMEN: soft, nontender, without hepatosplenomegaly or masses  SKIN: no suspicious lesions or rashes  NEURO: Normal strength and tone, sensory exam grossly normal, mentation appears intact and speech normal  PSYCH: mood and affect normal/bright           Assessment and Plan:     Patient is a 60 year old female seen for:  (K21.9) Gastroesophageal reflux disease without esophagitis  (primary encounter diagnosis)  Comment: Patient will still have some reflux symptoms but she feels it is well managed with her omeprazole. She does not wish to make any changes at this time.  Plan: Continue omeprazole     (I10) Benign essential hypertension  Comment: Patient with elevated BP at today's visit, 176/94. She has been taking the medication consistently. Her BP readings at home are around 140s/80s.   Plan: Increase lisinopril to 10 mg daily, counseled her to take at night, continue with exercise, follow-up visit in 2 weeks    Options for treatment and follow-up care were reviewed with the patient and/or guardian. Bushra Levy and/or guardian engaged in the decision making process and verbalized understanding of the options discussed and agreed with the final plan.    Linn Parrish MD  [facility] " Family Medicine Resident   Pager#[phone]    Precepted with: Padmini Hernandez DO

## 2021-01-07 NOTE — NURSING NOTE
Due to patient being non-English speaking/uses sign language, an  was used for this visit. Only for face-to-face interpretation by an external agency, date and length of interpretation can be found on the scanned worksheet.     name: jesse cruz   Agency: [employer]  Language: Hmong   Telephone number:   Type of interpretation: Telephone, spoken

## 2021-01-18 NOTE — PROGRESS NOTES
Preceptor Attestation:   Patient seen, evaluated and discussed with the resident. I have verified the content of the note, which accurately reflects my assessment of the patient and the plan of care.  Supervising Physician:Padmini Hernandez DO [facility]

## 2021-04-03 ENCOUNTER — IMMUNIZATION (OUTPATIENT)
Dept: FAMILY MEDICINE | Facility: CLINIC | Age: 61
End: 2021-04-03
Payer: COMMERCIAL

## 2021-04-03 PROCEDURE — 91303 PR COVID VAC JANSSEN AD26 0.5ML: CPT

## 2021-04-03 PROCEDURE — 0031A PR COVID VAC JANSSEN AD26 0.5ML: CPT

## 2021-05-11 ENCOUNTER — RECORDS - HEALTHEAST (OUTPATIENT)
Dept: LAB | Facility: CLINIC | Age: 61
End: 2021-05-11

## 2021-05-11 LAB
ALBUMIN SERPL-MCNC: 4 G/DL (ref 3.5–5)
ALP SERPL-CCNC: 76 U/L (ref 45–120)
ALT SERPL W P-5'-P-CCNC: 42 U/L (ref 0–45)
ANION GAP SERPL CALCULATED.3IONS-SCNC: 11 MMOL/L (ref 5–18)
AST SERPL W P-5'-P-CCNC: 24 U/L (ref 0–40)
BILIRUB SERPL-MCNC: 0.3 MG/DL (ref 0–1)
BUN SERPL-MCNC: 16 MG/DL (ref 8–22)
CALCIUM SERPL-MCNC: 9.2 MG/DL (ref 8.5–10.5)
CHLORIDE BLD-SCNC: 104 MMOL/L (ref 98–107)
CHOLEST SERPL-MCNC: 161 MG/DL
CO2 SERPL-SCNC: 27 MMOL/L (ref 22–31)
CREAT SERPL-MCNC: 0.72 MG/DL (ref 0.6–1.1)
FASTING STATUS PATIENT QL REPORTED: ABNORMAL
GFR SERPL CREATININE-BSD FRML MDRD: >60 ML/MIN/1.73M2
GLUCOSE BLD-MCNC: 138 MG/DL (ref 70–125)
HDLC SERPL-MCNC: 49 MG/DL
LDLC SERPL CALC-MCNC: 89 MG/DL
POTASSIUM BLD-SCNC: 4.1 MMOL/L (ref 3.5–5)
PROT SERPL-MCNC: 7.6 G/DL (ref 6–8)
SODIUM SERPL-SCNC: 142 MMOL/L (ref 136–145)
TRIGL SERPL-MCNC: 113 MG/DL

## 2021-05-13 ENCOUNTER — RECORDS - HEALTHEAST (OUTPATIENT)
Dept: LAB | Facility: CLINIC | Age: 61
End: 2021-05-13

## 2021-05-14 LAB — H PYLORI AG STL QL IA: NEGATIVE

## 2021-05-31 ENCOUNTER — RECORDS - HEALTHEAST (OUTPATIENT)
Dept: ADMINISTRATIVE | Facility: CLINIC | Age: 61
End: 2021-05-31

## 2021-06-05 ENCOUNTER — RECORDS - HEALTHEAST (OUTPATIENT)
Dept: SCHEDULING | Facility: CLINIC | Age: 61
End: 2021-06-05

## 2021-06-05 DIAGNOSIS — R10.9 ABDOMINAL PAIN: ICD-10-CM

## 2021-06-11 NOTE — PROGRESS NOTES
"[facility] Daily Progress     Patient Name: Bushra Levy  Date: 2017  Visit #: 2  PTA visit #:    Referral Diagnosis: chronic R shoulder pain  Referring provider: Yanet Rossi  Visit Diagnosis:     ICD-10-CM    1. Shoulder impingement syndrome, left M75.42    2. Shoulder impingement, right M75.41    3. Poor posture R29.3    4. Generalized muscle weakness M62.81          Assessment:   Patient hasn't been to clinic since 17. She has not been compliant with her HEP and needed mod-max A with her 2 exercises given at previous session today.    HEP/POC compliance is  poor.    Goal Status:  Pt. will demonstrate/verbalize independence in self-management of condition in : 6 weeks  Pt. will be independent with home exercise program in : 6 weeks  Pt. will have improved quality of sleep: waking less times/night;with less pain;in 6 weeks  Pt will: have less than 3/10 in bilateral shoulders for at least 24 hours  in order to improve QOL within 8 weeks.    Plan / Patient Education:     Continue with initial plan of care.  Progress with home program as tolerated.     Exercises:  Exercise #1: scap sets  Comment #1: x10 with 5\" holds  Exercise #2: doorway pec stretch  Comment #2: 30\" holds B  Exercise #3: shoulder ext, rows, and IR B  Comment #3: with L1, until fatigue, 5\" holds     Plan for next session: pulley's, review HEP, push up with a plus, shoulder ER    Subjective:     Pain Ratin/10    Still having pain. Icing shoulder 2x/week. Doing her exercises 1x/day     from [employer] helping with today's session: Ramone    Objective:     B shoulder AROM:   Flexion- 180 degrees B, \"stretch\"   Abduction- 180 degrees B, \"stretch\"   IR: L4, R shoulder painful   ER: C6 B, \"stretch\"    Max assist and demonstration needed with scap sets  Mod A needed with doorway stretch    Treatment Today     TREATMENT MINUTES COMMENTS   Evaluation     Self-care/ Home management     Manual therapy     Neuromuscular Re-education   "   Therapeutic Activity     Therapeutic Exercises 24 Pulley's 5'- flex/abd. Discussed progress. Reviewed HEP. Progressed HEP- see flow sheet.   Gait training     Modality__________________                Total 24    Blank areas are intentional and mean the treatment did not include these items.       Domenic Hancock, PT, DPT  7/13/2017

## 2021-06-11 NOTE — PROGRESS NOTES
[facility]   Shoulder Initial Evaluation    Patient Name: Bushra Levy  Date of evaluation: 6/29/2017  Referral Diagnosis: Tendinopathy of rotator cuff, right  Referring provider: Yanet Rossi  Visit Diagnosis:     ICD-10-CM    1. Shoulder impingement syndrome, left M75.42    2. Shoulder impingement, right M75.41    3. Poor posture R29.3    4. Generalized muscle weakness M62.81    5. Depression F32.9    6. Myofascial pain M79.1        Assessment:      Bushra Levy is a 57 y.o. female who presents to therapy today with chief complaints of bilateral shoulder pain. Symptoms began 1 year ago with sudden onset.  Patient has a PMH that is positive for pre-diabetes and depression. Difficulty with transfers, changing sleeping positions, walking on uneven surfaces, lifting, grooming/dress, yard/house work, wash/bath/shower, meal preparation, carrying laundry/groceries, dependent care, sleeping  due to pain.  Pain symptoms are intermittent and not improving.  Patient demonstrates signs and sx consistent with bilateral shoulder impingement, weakness, myofascial pain, nd posture. Patient is appropriate for skilled therapy services.    Goals:  Pt. will demonstrate/verbalize independence in self-management of condition in : 6 weeks  Pt. will be independent with home exercise program in : 6 weeks  Pt. will have improved quality of sleep: waking less times/night;with less pain;in 6 weeks  Pt will: have less than 3/10 in bilateral shoulders for at least 24 hours  in order to improve QOL within 8 weeks.    Patient's prognosis: fair    Barriers to Learning or Achieving Goals:  Chronicity of the problem.       Plan / Patient Instructions:        Plan of Care:   Patient Related Instruction: Nature of Condition;Treatment plan and rationale;Self Care instruction;Basis of treatment;Posture;Precautions;Next steps;Expected outcome  Times per Week: 1-2  Number of Weeks: 6-8  Number of Visits: 12  Precautions / Restrictions : hx of  "depression  Therapeutic Exercise: ROM;Stretching;Strengthening  Neuromuscular Reeducation: posture;kinesio tape;core  Manual Therapy: myofascial release;joint mobilization  Modalities: ultrasound  Equipment: theraband     Exercises:  Exercise #1: scap sets  Comment #1: x10 with 5\" holds  Exercise #2: doorway pec stretch  Comment #2: 30\" holds B    POC and pathology of condition were reviewed with patient.  Pt. is in agreement with the Plan of Care  A Home Exercise Program (HEP) was initiated today.  Pt. was instructed in exercises by PT and patient was given a handout with detailed instructions.     Plan for upcoming visits: pulley's, add in s/l ER AROM, push up with a plus, rows/shoulder extensions  .   Subjective:       Social information:   Occupation:unemployed   Work Status:NA    Hobbies: walking, [employer] garden      History of Present Illness:    Bushra is a 57 y.o. female who presents to therapy today with complaints of B shoulder pain that radiates above her elbow. Pain varies and some days the R shoulder seems worse, while other days the left seems worse. Date of onset/duration of symptoms is 1 year ago. Symptoms are intermittent and not improving. She reports  an episodic  history of similar symptoms. Previously, she took medication and the pain went away.  She describes their previous level of function as not limited. Hasn't tried ice or heat. Tylenol and ibuprofen seem to help with her pain. She notices pain in her head/neck/shoulders sometimes when she's scared.    Pain Ratin  Pain rating at best: 6  Pain rating at worst: 9  Pain description: spasms    Functional limitations are described as occurring with: transfers, changing sleeping positions, walking on uneven surfaces, lifting, grooming/dress, yard/house work, wash/bath/shower, meal preparation, carrying laundry/groceries, dependent care, sleeping      Patient reports benefit from:  anti-inflammatory      helping with today's session: Lucio " "Nancy       Objective:      Note: Items left blank indicates the item was not performed or not indicated at the time of the evaluation.    7' late to apt    Shoulder Examination  1. Shoulder impingement syndrome, left     2. Shoulder impingement, right     3. Poor posture     4. Generalized muscle weakness     5. Depression     6. Myofascial pain       Involved side: Bilateral  Posture Observation:      General sitting posture is  poor. Protracted shoulders, forward head  Cervical AROM:   All WNL, all motions \"stiff\" in myofascial areas    Flat affect    Shoulder/Elbow ROM  ALL MOTION WNL AND SYMMETRICAL  Date:      Shoulder and Elbow ROM ( )   AROM in degrees AROM in degrees AROM in degrees    Right Left Right Left Right Left   Shoulder Flexion (0-180 )         Shoulder Abduction (0-180 ) Soreness in shoulders Soreness in shoulders       Shoulder Extension (0-60 )         Shoulder ER (0-90 )         Shoulder IR (0-70 ) Pain in sides of upper arms Pain in sides of upper arms       Shoulder IR/Ext         Elbow Flexion (150 )         Elbow Extension (0 )          PROM in degrees PROM in degrees PROM in degrees    Right Left Right Left Right Left   Shoulder Flexion (0-180 )         Shoulder Abduction (0-180 )         Shoulder Extension (0-60 )         Shoulder ER (0-90 )         Shoulder IR (0-70 )         Elbow Flexion (150 )         Elbow Extension (0 )           Shoulder/Elbow Strength *pain is \"all over\" both shoulders while testing today; no specific areas of pain  Date:      Shoulder/Elbow Strength (/5)  Manual Muscle Test (MMT) MMT MMT MMT    Right Left Right Left Right Left   Shoulder Flexion 4-, most painful 4-, most painful       Supraspinatus         Shoulder Abduction 4-, painful 4-, painful       Shoulder Extension 4+ 4+       Shoulder External Rotation 4 4       Shoulder Internal Rotation 4 4       Elbow Flexion 4+ 4+       Elbow Extension 4+ 4+       Other:         Other:           Sensation: intact B " UE's per subjective    Palpation: tender L IS      Shoulder Special Tests     Impingement Cluster Right (+/-) Left (+/-) Rotator Cuff Tests Right (+/-) Left (+/-)   Gutierrez-Logan +  Drop Arm Sign - -   Painful Arc +  Hornblowers     Infraspinatus Test Mild pain Mild pain ERLS     AC Tests Right (+/-) Left (+/-) IRLS     Active Compression - - Labral Tests Right (+/-) Left (+/-)   Crossbody Adduction - - Biceps Load Test II     AC Resisted Extension   Jerk Test     GH Instability Tests Right (+/-) Left (+/-) Camelia Test     Sulcus Sign   Biceps Tests Right (+/-) Left (+/-)   Apprehension   Speed - -   Relocation   Nicole snowden     Other:   Other: Neer's - +   Other:   Other:         Treatment Today    TREATMENT MINUTES COMMENTS   Evaluation 22    Self-care/ Home management     Manual therapy     Neuromuscular Re-education     Therapeutic Activity     Therapeutic Exercises 20 Discussed PT POC and pathology of condition. Began HEP- see flow sheet. Discussed posture and gave icing instructions   Gait training     Modality__________________                Total 42    Blank areas are intentional and mean the treatment did not include these items.     PT Evaluation Code: (Please list factors)  Patient History/Comorbidities: pre-diabetic (no other conditions given by patient), depression  Examination: bilateral shoulder pain, poor posture  Clinical Presentation: stable  Clinical Decision Making: low    Patient History/  Comorbidities Examination  (body structures and functions, activity limitations, and/or participation restrictions) Clinical Presentation Clinical Decision Making (Complexity)   No documented Comorbidities or personal factors 1-2 Elements Stable and/or uncomplicated Low   1-2 documented comorbidities or personal factor 3 Elements Evolving clinical presentation with changing characteristics Moderate   3-4 documented comorbidities or personal factors 4 or more Unstable and unpredictable High              Alisse  Karissa, PT, DPT  6/29/2017  2:30 PM

## 2021-06-12 NOTE — PROGRESS NOTES
[facility] Discharge Summary  Patient Name: Bushra Levy  Date: 8/22/2017  Referral Diagnosis: chronic R shoulder pain  Referring provider: Yanet Rossi DO  Visit Diagnosis:   1. Shoulder impingement syndrome, left     2. Shoulder impingement, right     3. Poor posture     4. Generalized muscle weakness         Goals:  Pt. will demonstrate/verbalize independence in self-management of condition in : 6 weeks  Pt. will be independent with home exercise program in : 6 weeks  Pt. will have improved quality of sleep: waking less times/night;with less pain;in 6 weeks  Pt will: have less than 3/10 in bilateral shoulders for at least 24 hours  in order to improve QOL within 8 weeks.    Patient was seen for 3 visits with 1 missed appointments.  Patient was not compliant with PT POC or her HEP. She did not return after her 3rd visit so goal status is unknown.  She was given a HEP and instructed in proper usage.    Therapy will be discontinued at this time.  The patient will need a new referral to resume.    Thank you for your referral.  Domenic Hancock, PT, DPT  8/22/2017  2:54 PM    [facility] Daily Progress     Patient Name: Bushra Levy  Date: 7/27/2017  Visit #: 3  PTA visit #:    Referral Diagnosis: chronic R shoulder pain  Referring provider: Yanet Rossi  Visit Diagnosis:     ICD-10-CM    1. Shoulder impingement syndrome, left M75.42    2. Shoulder impingement, right M75.41    3. Poor posture R29.3    4. Generalized muscle weakness M62.81          Assessment:   Patient coming 1x/week every 2 weeks for therapy, although told to come in 1-2x/week. She is not doing her HEP correctly, despite maximal cueing each session and handouts given for each exercise. Reminded patient again to ice daily and to work on her posture    HEP/POC compliance is  poor.    Goal Status:  Pt. will demonstrate/verbalize independence in self-management of condition in : 6 weeks  Pt. will be independent with home exercise  "program in : 6 weeks  Pt. will have improved quality of sleep: waking less times/night;with less pain;in 6 weeks  Pt will: have less than 3/10 in bilateral shoulders for at least 24 hours  in order to improve QOL within 8 weeks.    Plan / Patient Education:     Continue with initial plan of care.  Progress with home program as tolerated.     Exercises:  Exercise #1: scap sets  Comment #1: x10 with 5\" holds  Exercise #2: doorway pec stretch  Comment #2: 30\" holds B  Exercise #3: shoulder ext, rows, and IR B  Comment #3: with L1, until fatigue, 5\" holds  Exercise #4: supine hands behind head pec stretch  Comment #4: 30\" holds B     Plan for next session: pulley's, review HEP, add posterior capsule stretch, push up with a plus    Subjective:     Pain Ratin/10    L shoulder feels better, R shoulder is still painful. Exercises are going well. She's doing her exercises 2x/day     from [employer] helping with today's session: Ramone    Objective:     B shoulder AROM: WNL, symmetrical. Pain in anterior R shoulder with all movements at end-range  Some pain with shoulder ER- told patient to hold a few days, as she's probably flared up from doing her HEP wrong at home  Max assist and demonstration needed with IR/ER with TB  Mod A needed with doorway stretch  Tight pec minor B  Poor posture    Treatment Today     TREATMENT MINUTES COMMENTS   Evaluation     Self-care/ Home management     Manual therapy     Neuromuscular Re-education     Therapeutic Activity     Therapeutic Exercises 26 Pulley's 5'- flex/abd. Discussed progress. Reviewed HEP. Added in hands behind head stretch for pecs.    Gait training     Modality__________________                Total 26    Blank areas are intentional and mean the treatment did not include these items.       Domenic Hancock, PT, DPT  2017    "

## 2021-08-02 NOTE — PROGRESS NOTES
Preceptor Attestation:  Patient's case reviewed and discussed with Yanet Rossi DO resident and I evaluated the patient. I agree with written assessment and plan of care.  Supervising Physician:Ar Mena MD    [facility]   (4) Less than 3 years old

## 2023-03-16 ENCOUNTER — LAB REQUISITION (OUTPATIENT)
Dept: LAB | Facility: CLINIC | Age: 63
End: 2023-03-16

## 2023-03-16 DIAGNOSIS — I10 ESSENTIAL (PRIMARY) HYPERTENSION: ICD-10-CM

## 2023-03-16 LAB
ALBUMIN SERPL BCG-MCNC: 4.4 G/DL (ref 3.5–5.2)
ALP SERPL-CCNC: 75 U/L (ref 35–104)
ALT SERPL W P-5'-P-CCNC: 29 U/L (ref 10–35)
ANION GAP SERPL CALCULATED.3IONS-SCNC: 12 MMOL/L (ref 7–15)
AST SERPL W P-5'-P-CCNC: 20 U/L (ref 10–35)
BILIRUB SERPL-MCNC: 0.2 MG/DL
BUN SERPL-MCNC: 17 MG/DL (ref 8–23)
CALCIUM SERPL-MCNC: 9.3 MG/DL (ref 8.8–10.2)
CHLORIDE SERPL-SCNC: 104 MMOL/L (ref 98–107)
CREAT SERPL-MCNC: 0.79 MG/DL (ref 0.51–0.95)
DEPRECATED HCO3 PLAS-SCNC: 27 MMOL/L (ref 22–29)
GFR SERPL CREATININE-BSD FRML MDRD: 85 ML/MIN/1.73M2
GLUCOSE SERPL-MCNC: 169 MG/DL (ref 70–99)
POTASSIUM SERPL-SCNC: 4.3 MMOL/L (ref 3.4–5.3)
PROT SERPL-MCNC: 7.7 G/DL (ref 6.4–8.3)
SODIUM SERPL-SCNC: 143 MMOL/L (ref 136–145)

## 2023-03-16 PROCEDURE — 80053 COMPREHEN METABOLIC PANEL: CPT | Performed by: NURSE PRACTITIONER

## 2023-03-16 PROCEDURE — 82570 ASSAY OF URINE CREATININE: CPT | Performed by: NURSE PRACTITIONER

## 2023-03-17 LAB
CREAT UR-MCNC: 16.3 MG/DL
MICROALBUMIN UR-MCNC: 27.9 MG/L
MICROALBUMIN/CREAT UR: 171.17 MG/G CR (ref 0–25)

## 2023-09-14 ENCOUNTER — LAB REQUISITION (OUTPATIENT)
Dept: LAB | Facility: CLINIC | Age: 63
End: 2023-09-14

## 2023-09-14 DIAGNOSIS — Z13.6 ENCOUNTER FOR SCREENING FOR CARDIOVASCULAR DISORDERS: ICD-10-CM

## 2023-09-14 DIAGNOSIS — R73.03 PREDIABETES: ICD-10-CM

## 2023-09-14 LAB
ALBUMIN SERPL BCG-MCNC: 4.2 G/DL (ref 3.5–5.2)
ALP SERPL-CCNC: 71 U/L (ref 35–104)
ALT SERPL W P-5'-P-CCNC: 26 U/L (ref 0–50)
ANION GAP SERPL CALCULATED.3IONS-SCNC: 12 MMOL/L (ref 7–15)
AST SERPL W P-5'-P-CCNC: 21 U/L (ref 0–45)
BILIRUB SERPL-MCNC: 0.2 MG/DL
BUN SERPL-MCNC: 11.3 MG/DL (ref 8–23)
CALCIUM SERPL-MCNC: 9.1 MG/DL (ref 8.8–10.2)
CHLORIDE SERPL-SCNC: 103 MMOL/L (ref 98–107)
CHOLEST SERPL-MCNC: 160 MG/DL
CREAT SERPL-MCNC: 0.71 MG/DL (ref 0.51–0.95)
DEPRECATED HCO3 PLAS-SCNC: 26 MMOL/L (ref 22–29)
EGFRCR SERPLBLD CKD-EPI 2021: >90 ML/MIN/1.73M2
GLUCOSE SERPL-MCNC: 117 MG/DL (ref 70–99)
HDLC SERPL-MCNC: 54 MG/DL
LDLC SERPL CALC-MCNC: 77 MG/DL
NONHDLC SERPL-MCNC: 106 MG/DL
POTASSIUM SERPL-SCNC: 4.2 MMOL/L (ref 3.4–5.3)
PROT SERPL-MCNC: 7.5 G/DL (ref 6.4–8.3)
SODIUM SERPL-SCNC: 141 MMOL/L (ref 136–145)
TRIGL SERPL-MCNC: 143 MG/DL

## 2023-09-14 PROCEDURE — 80053 COMPREHEN METABOLIC PANEL: CPT | Performed by: NURSE PRACTITIONER

## 2023-09-14 PROCEDURE — 80061 LIPID PANEL: CPT | Performed by: NURSE PRACTITIONER

## 2023-12-21 ENCOUNTER — LAB REQUISITION (OUTPATIENT)
Dept: LAB | Facility: CLINIC | Age: 63
End: 2023-12-21

## 2023-12-21 DIAGNOSIS — R42 DIZZINESS AND GIDDINESS: ICD-10-CM

## 2023-12-21 DIAGNOSIS — R10.11 RIGHT UPPER QUADRANT PAIN: ICD-10-CM

## 2023-12-21 PROCEDURE — 80053 COMPREHEN METABOLIC PANEL: CPT | Performed by: NURSE PRACTITIONER

## 2023-12-21 PROCEDURE — 84439 ASSAY OF FREE THYROXINE: CPT | Performed by: NURSE PRACTITIONER

## 2023-12-21 PROCEDURE — 83690 ASSAY OF LIPASE: CPT | Performed by: NURSE PRACTITIONER

## 2023-12-21 PROCEDURE — 84443 ASSAY THYROID STIM HORMONE: CPT | Performed by: NURSE PRACTITIONER

## 2023-12-22 LAB
ALBUMIN SERPL BCG-MCNC: 4.4 G/DL (ref 3.5–5.2)
ALP SERPL-CCNC: 77 U/L (ref 40–150)
ALT SERPL W P-5'-P-CCNC: 29 U/L (ref 0–50)
ANION GAP SERPL CALCULATED.3IONS-SCNC: 13 MMOL/L (ref 7–15)
AST SERPL W P-5'-P-CCNC: 23 U/L (ref 0–45)
BILIRUB SERPL-MCNC: 0.3 MG/DL
BUN SERPL-MCNC: 9.1 MG/DL (ref 8–23)
CALCIUM SERPL-MCNC: 9.4 MG/DL (ref 8.8–10.2)
CHLORIDE SERPL-SCNC: 100 MMOL/L (ref 98–107)
CREAT SERPL-MCNC: 0.54 MG/DL (ref 0.51–0.95)
DEPRECATED HCO3 PLAS-SCNC: 28 MMOL/L (ref 22–29)
EGFRCR SERPLBLD CKD-EPI 2021: >90 ML/MIN/1.73M2
GLUCOSE SERPL-MCNC: 178 MG/DL (ref 70–99)
LIPASE SERPL-CCNC: 42 U/L (ref 13–60)
POTASSIUM SERPL-SCNC: 3.5 MMOL/L (ref 3.4–5.3)
PROT SERPL-MCNC: 8.1 G/DL (ref 6.4–8.3)
SODIUM SERPL-SCNC: 141 MMOL/L (ref 135–145)
T4 FREE SERPL-MCNC: 1.12 NG/DL (ref 0.9–1.7)
TSH SERPL DL<=0.005 MIU/L-ACNC: 0.85 UIU/ML (ref 0.3–4.2)

## 2023-12-29 ENCOUNTER — LAB REQUISITION (OUTPATIENT)
Dept: LAB | Facility: CLINIC | Age: 63
End: 2023-12-29

## 2023-12-29 DIAGNOSIS — K21.9 GASTRO-ESOPHAGEAL REFLUX DISEASE WITHOUT ESOPHAGITIS: ICD-10-CM

## 2023-12-29 PROCEDURE — 87338 HPYLORI STOOL AG IA: CPT | Performed by: NURSE PRACTITIONER

## 2024-01-02 LAB — H PYLORI AG STL QL IA: NEGATIVE

## 2024-08-06 ENCOUNTER — LAB REQUISITION (OUTPATIENT)
Dept: LAB | Facility: CLINIC | Age: 64
End: 2024-08-06

## 2024-08-06 DIAGNOSIS — M19.072 PRIMARY OSTEOARTHRITIS, LEFT ANKLE AND FOOT: ICD-10-CM

## 2024-08-06 DIAGNOSIS — I10 ESSENTIAL (PRIMARY) HYPERTENSION: ICD-10-CM

## 2024-08-06 LAB — ERYTHROCYTE [SEDIMENTATION RATE] IN BLOOD BY WESTERGREN METHOD: 16 MM/HR (ref 0–30)

## 2024-08-06 PROCEDURE — 86200 CCP ANTIBODY: CPT | Performed by: NURSE PRACTITIONER

## 2024-08-06 PROCEDURE — 86431 RHEUMATOID FACTOR QUANT: CPT | Performed by: NURSE PRACTITIONER

## 2024-08-06 PROCEDURE — 86140 C-REACTIVE PROTEIN: CPT | Performed by: NURSE PRACTITIONER

## 2024-08-06 PROCEDURE — 84550 ASSAY OF BLOOD/URIC ACID: CPT | Performed by: NURSE PRACTITIONER

## 2024-08-06 PROCEDURE — 80053 COMPREHEN METABOLIC PANEL: CPT | Performed by: NURSE PRACTITIONER

## 2024-08-06 PROCEDURE — 86038 ANTINUCLEAR ANTIBODIES: CPT | Performed by: NURSE PRACTITIONER

## 2024-08-06 PROCEDURE — 85652 RBC SED RATE AUTOMATED: CPT | Performed by: NURSE PRACTITIONER

## 2024-08-07 LAB
ALBUMIN SERPL BCG-MCNC: 4.1 G/DL (ref 3.5–5.2)
ALP SERPL-CCNC: 62 U/L (ref 40–150)
ALT SERPL W P-5'-P-CCNC: 27 U/L (ref 0–50)
ANA PAT SER IF-IMP: ABNORMAL
ANA SER QL IF: ABNORMAL
ANA TITR SER IF: ABNORMAL {TITER}
ANION GAP SERPL CALCULATED.3IONS-SCNC: 12 MMOL/L (ref 7–15)
AST SERPL W P-5'-P-CCNC: 20 U/L (ref 0–45)
BILIRUB SERPL-MCNC: 0.3 MG/DL
BUN SERPL-MCNC: 9.2 MG/DL (ref 8–23)
CALCIUM SERPL-MCNC: 8.9 MG/DL (ref 8.8–10.4)
CCP AB SER IA-ACNC: 0.6 U/ML
CHLORIDE SERPL-SCNC: 103 MMOL/L (ref 98–107)
CREAT SERPL-MCNC: 0.56 MG/DL (ref 0.51–0.95)
CRP SERPL-MCNC: <3 MG/L
EGFRCR SERPLBLD CKD-EPI 2021: >90 ML/MIN/1.73M2
GLUCOSE SERPL-MCNC: 181 MG/DL (ref 70–99)
HCO3 SERPL-SCNC: 26 MMOL/L (ref 22–29)
POTASSIUM SERPL-SCNC: 4 MMOL/L (ref 3.4–5.3)
PROT SERPL-MCNC: 7.3 G/DL (ref 6.4–8.3)
RHEUMATOID FACT SERPL-ACNC: <10 IU/ML
SODIUM SERPL-SCNC: 141 MMOL/L (ref 135–145)
URATE SERPL-MCNC: 6.1 MG/DL (ref 2.4–5.7)

## 2024-11-01 ENCOUNTER — LAB REQUISITION (OUTPATIENT)
Dept: LAB | Facility: CLINIC | Age: 64
End: 2024-11-01

## 2024-11-01 DIAGNOSIS — R73.03 PREDIABETES: ICD-10-CM

## 2024-11-01 PROCEDURE — 80053 COMPREHEN METABOLIC PANEL: CPT | Performed by: NURSE PRACTITIONER

## 2024-11-02 LAB
ALBUMIN SERPL BCG-MCNC: 4.2 G/DL (ref 3.5–5.2)
ALP SERPL-CCNC: 66 U/L (ref 40–150)
ALT SERPL W P-5'-P-CCNC: 21 U/L (ref 0–50)
ANION GAP SERPL CALCULATED.3IONS-SCNC: 13 MMOL/L (ref 7–15)
AST SERPL W P-5'-P-CCNC: 18 U/L (ref 0–45)
BILIRUB SERPL-MCNC: 0.3 MG/DL
BUN SERPL-MCNC: 11.6 MG/DL (ref 8–23)
CALCIUM SERPL-MCNC: 9.2 MG/DL (ref 8.8–10.4)
CHLORIDE SERPL-SCNC: 101 MMOL/L (ref 98–107)
CREAT SERPL-MCNC: 0.6 MG/DL (ref 0.51–0.95)
EGFRCR SERPLBLD CKD-EPI 2021: >90 ML/MIN/1.73M2
GLUCOSE SERPL-MCNC: 237 MG/DL (ref 70–99)
HCO3 SERPL-SCNC: 27 MMOL/L (ref 22–29)
POTASSIUM SERPL-SCNC: 4.1 MMOL/L (ref 3.4–5.3)
PROT SERPL-MCNC: 7.5 G/DL (ref 6.4–8.3)
SODIUM SERPL-SCNC: 141 MMOL/L (ref 135–145)

## 2024-11-03 ENCOUNTER — APPOINTMENT (OUTPATIENT)
Dept: CT IMAGING | Facility: HOSPITAL | Age: 64
End: 2024-11-03
Payer: COMMERCIAL

## 2024-11-03 ENCOUNTER — APPOINTMENT (OUTPATIENT)
Dept: ULTRASOUND IMAGING | Facility: HOSPITAL | Age: 64
End: 2024-11-03
Payer: COMMERCIAL

## 2024-11-03 ENCOUNTER — HOSPITAL ENCOUNTER (EMERGENCY)
Facility: HOSPITAL | Age: 64
Discharge: HOME | End: 2024-11-03
Payer: COMMERCIAL

## 2024-11-03 VITALS
SYSTOLIC BLOOD PRESSURE: 134 MMHG | TEMPERATURE: 97.9 F | BODY MASS INDEX: 27.04 KG/M2 | OXYGEN SATURATION: 99 % | HEART RATE: 78 BPM | RESPIRATION RATE: 16 BRPM | WEIGHT: 135 LBS | DIASTOLIC BLOOD PRESSURE: 68 MMHG

## 2024-11-03 DIAGNOSIS — K29.70 GASTRITIS: ICD-10-CM

## 2024-11-03 DIAGNOSIS — R10.11 RUQ ABDOMINAL PAIN: ICD-10-CM

## 2024-11-03 LAB
ALBUMIN SERPL BCG-MCNC: 4.2 G/DL (ref 3.5–5.2)
ALBUMIN UR-MCNC: 20 MG/DL
ALP SERPL-CCNC: 74 U/L (ref 40–150)
ALT SERPL W P-5'-P-CCNC: 20 U/L (ref 0–50)
ANION GAP SERPL CALCULATED.3IONS-SCNC: 16 MMOL/L (ref 7–15)
APPEARANCE UR: CLEAR
AST SERPL W P-5'-P-CCNC: 20 U/L (ref 0–45)
BASOPHILS # BLD AUTO: 0 10E3/UL (ref 0–0.2)
BASOPHILS NFR BLD AUTO: 0 %
BILIRUB DIRECT SERPL-MCNC: <0.2 MG/DL (ref 0–0.3)
BILIRUB SERPL-MCNC: 0.4 MG/DL
BILIRUB UR QL STRIP: NEGATIVE
BUN SERPL-MCNC: 7.6 MG/DL (ref 8–23)
CALCIUM SERPL-MCNC: 8.9 MG/DL (ref 8.8–10.4)
CHLORIDE SERPL-SCNC: 98 MMOL/L (ref 98–107)
COLOR UR AUTO: ABNORMAL
CREAT SERPL-MCNC: 0.49 MG/DL (ref 0.51–0.95)
D DIMER PPP FEU-MCNC: 0.27 UG/ML FEU (ref 0–0.5)
EGFRCR SERPLBLD CKD-EPI 2021: >90 ML/MIN/1.73M2
EOSINOPHIL # BLD AUTO: 0 10E3/UL (ref 0–0.7)
EOSINOPHIL NFR BLD AUTO: 0 %
ERYTHROCYTE [DISTWIDTH] IN BLOOD BY AUTOMATED COUNT: 11.7 % (ref 10–15)
GLUCOSE SERPL-MCNC: 137 MG/DL (ref 70–99)
GLUCOSE UR STRIP-MCNC: NEGATIVE MG/DL
HCO3 SERPL-SCNC: 24 MMOL/L (ref 22–29)
HCT VFR BLD AUTO: 41.7 % (ref 35–47)
HGB BLD-MCNC: 14.4 G/DL (ref 11.7–15.7)
HGB UR QL STRIP: NEGATIVE
IMM GRANULOCYTES # BLD: 0.1 10E3/UL
IMM GRANULOCYTES NFR BLD: 1 %
KETONES UR STRIP-MCNC: NEGATIVE MG/DL
LEUKOCYTE ESTERASE UR QL STRIP: NEGATIVE
LIPASE SERPL-CCNC: 30 U/L (ref 13–60)
LYMPHOCYTES # BLD AUTO: 1.2 10E3/UL (ref 0.8–5.3)
LYMPHOCYTES NFR BLD AUTO: 13 %
MAGNESIUM SERPL-MCNC: 2 MG/DL (ref 1.7–2.3)
MCH RBC QN AUTO: 31 PG (ref 26.5–33)
MCHC RBC AUTO-ENTMCNC: 34.5 G/DL (ref 31.5–36.5)
MCV RBC AUTO: 90 FL (ref 78–100)
MONOCYTES # BLD AUTO: 0.4 10E3/UL (ref 0–1.3)
MONOCYTES NFR BLD AUTO: 5 %
NEUTROPHILS # BLD AUTO: 7.3 10E3/UL (ref 1.6–8.3)
NEUTROPHILS NFR BLD AUTO: 81 %
NITRATE UR QL: NEGATIVE
NRBC # BLD AUTO: 0 10E3/UL
NRBC BLD AUTO-RTO: 0 /100
PH UR STRIP: 6.5 [PH] (ref 5–7)
PLATELET # BLD AUTO: 174 10E3/UL (ref 150–450)
POTASSIUM SERPL-SCNC: 3.6 MMOL/L (ref 3.4–5.3)
PROT SERPL-MCNC: 7.6 G/DL (ref 6.4–8.3)
RBC # BLD AUTO: 4.65 10E6/UL (ref 3.8–5.2)
RBC URINE: 1 /HPF
SODIUM SERPL-SCNC: 138 MMOL/L (ref 135–145)
SP GR UR STRIP: 1.01 (ref 1–1.03)
SQUAMOUS EPITHELIAL: 1 /HPF
TROPONIN T SERPL HS-MCNC: <6 NG/L
UROBILINOGEN UR STRIP-MCNC: <2 MG/DL
WBC # BLD AUTO: 9 10E3/UL (ref 4–11)
WBC URINE: <1 /HPF

## 2024-11-03 PROCEDURE — 83735 ASSAY OF MAGNESIUM: CPT

## 2024-11-03 PROCEDURE — 85004 AUTOMATED DIFF WBC COUNT: CPT

## 2024-11-03 PROCEDURE — 96375 TX/PRO/DX INJ NEW DRUG ADDON: CPT

## 2024-11-03 PROCEDURE — 250N000011 HC RX IP 250 OP 636

## 2024-11-03 PROCEDURE — 36415 COLL VENOUS BLD VENIPUNCTURE: CPT

## 2024-11-03 PROCEDURE — 82248 BILIRUBIN DIRECT: CPT

## 2024-11-03 PROCEDURE — 83690 ASSAY OF LIPASE: CPT

## 2024-11-03 PROCEDURE — 84484 ASSAY OF TROPONIN QUANT: CPT

## 2024-11-03 PROCEDURE — 81003 URINALYSIS AUTO W/O SCOPE: CPT

## 2024-11-03 PROCEDURE — 76705 ECHO EXAM OF ABDOMEN: CPT

## 2024-11-03 PROCEDURE — 96374 THER/PROPH/DIAG INJ IV PUSH: CPT | Mod: 59

## 2024-11-03 PROCEDURE — 80048 BASIC METABOLIC PNL TOTAL CA: CPT

## 2024-11-03 PROCEDURE — 93005 ELECTROCARDIOGRAM TRACING: CPT

## 2024-11-03 PROCEDURE — 74177 CT ABD & PELVIS W/CONTRAST: CPT

## 2024-11-03 PROCEDURE — 85379 FIBRIN DEGRADATION QUANT: CPT

## 2024-11-03 PROCEDURE — 99285 EMERGENCY DEPT VISIT HI MDM: CPT | Mod: 25

## 2024-11-03 RX ORDER — MORPHINE SULFATE 2 MG/ML
2 INJECTION, SOLUTION INTRAMUSCULAR; INTRAVENOUS ONCE
Status: COMPLETED | OUTPATIENT
Start: 2024-11-03 | End: 2024-11-03

## 2024-11-03 RX ORDER — FAMOTIDINE 20 MG/1
20 TABLET, FILM COATED ORAL 2 TIMES DAILY PRN
Qty: 60 TABLET | Refills: 0 | Status: SHIPPED | OUTPATIENT
Start: 2024-11-03

## 2024-11-03 RX ORDER — IOPAMIDOL 755 MG/ML
66 INJECTION, SOLUTION INTRAVASCULAR ONCE
Status: COMPLETED | OUTPATIENT
Start: 2024-11-03 | End: 2024-11-03

## 2024-11-03 RX ORDER — KETOROLAC TROMETHAMINE 15 MG/ML
10 INJECTION, SOLUTION INTRAMUSCULAR; INTRAVENOUS ONCE
Status: COMPLETED | OUTPATIENT
Start: 2024-11-03 | End: 2024-11-03

## 2024-11-03 RX ORDER — ONDANSETRON 4 MG/1
4 TABLET, ORALLY DISINTEGRATING ORAL EVERY 8 HOURS PRN
Qty: 20 TABLET | Refills: 0 | Status: SHIPPED | OUTPATIENT
Start: 2024-11-03

## 2024-11-03 RX ORDER — ONDANSETRON 2 MG/ML
4 INJECTION INTRAMUSCULAR; INTRAVENOUS ONCE
Status: COMPLETED | OUTPATIENT
Start: 2024-11-03 | End: 2024-11-03

## 2024-11-03 RX ADMIN — IOPAMIDOL 66 ML: 755 INJECTION, SOLUTION INTRAVENOUS at 13:12

## 2024-11-03 RX ADMIN — ONDANSETRON 4 MG: 2 INJECTION INTRAMUSCULAR; INTRAVENOUS at 10:26

## 2024-11-03 RX ADMIN — KETOROLAC TROMETHAMINE 10 MG: 15 INJECTION, SOLUTION INTRAMUSCULAR; INTRAVENOUS at 10:26

## 2024-11-03 RX ADMIN — MORPHINE SULFATE 2 MG: 2 INJECTION, SOLUTION INTRAMUSCULAR; INTRAVENOUS at 13:07

## 2024-11-03 ASSESSMENT — ACTIVITIES OF DAILY LIVING (ADL)
ADLS_ACUITY_SCORE: 0

## 2024-11-03 ASSESSMENT — COLUMBIA-SUICIDE SEVERITY RATING SCALE - C-SSRS
6. HAVE YOU EVER DONE ANYTHING, STARTED TO DO ANYTHING, OR PREPARED TO DO ANYTHING TO END YOUR LIFE?: NO
2. HAVE YOU ACTUALLY HAD ANY THOUGHTS OF KILLING YOURSELF IN THE PAST MONTH?: NO
1. IN THE PAST MONTH, HAVE YOU WISHED YOU WERE DEAD OR WISHED YOU COULD GO TO SLEEP AND NOT WAKE UP?: NO

## 2024-11-03 NOTE — ED TRIAGE NOTES
She has had right sided abdominal pain for the last week. It radiates to her right shoulder. Also has diarrhea and nausea but no vomiting.

## 2024-11-03 NOTE — ED PROVIDER NOTES
"EMERGENCY DEPARTMENT ENCOUNTER      NAME: Bushra eLvy  AGE: 64 year old female  YOB: 1960  MRN: [id number]  EVALUATION DATE & TIME: No admission date for patient encounter.    PCP: Jono Lynne    ED PROVIDER: Concepcion Pillai PA-C    FINAL IMPRESSION:   Gastritis     CHIEF COMPLAINT:  RUQ abdominal pain     MEDICAL DECISION MAKING AND ED COURSE:    ED Course as of 11/03/24 1710   Sun Nov 03, 2024   6162 Patient is a 64-year-old female with a history of prediabetes and hypertension, no surgical history, presenting with right upper quadrant abdominal pain for \"a long time\".  Patient has been dealing with this and has made some dietary changes to try to alleviate it.  Over the past week, however, the pain has become severe and it is every time she eats.  She has had associated nausea and diarrhea.  She feels like the pain wraps around her right side and can even feel the pain in her right shoulder.  She has felt subjective fevers.  She denies vomiting, hematochezia, hematemesis, melena, urinary symptoms, leg swelling, flulike symptoms, pain, shortness of breath although states when the pain is so bad it \"takes her breath away\".  The only thing that really makes the pain worse is pressing on the area when it is there and it occurs postprandially.     Vitals here are unremarkable.  Patient has no tachycardia, fevers although is hypertensive at 177/81.  On exam, patient is well-appearing in no distress.  She has tenderness to the right upper quadrant without guarding or rebound.  Cardiopulmonary examination is unremarkable.  Her mucous membranes are moist.  No lower extremity edema.    I have a high suspicion for biliary etiology with the symptoms she is describing.  Will obtain a right upper quadrant ultrasound, labs, but will also get a urinalysis given her age and right-sided flank pain.  Will give Toradol and Zofran.   1430 Ultrasound without any definitive biliary pathology.  Bile duct dilatation " "but stable from previous imaging.  On recheck in the patient, she further describes that the pain sometimes radiates up into her right shoulder and right chest.  And that she feels \"short of breath\" when it occurs because he \"takes her breath away\".  Denies midsternal chest pain.  Denies trauma or injury.  At this point we will add on a couple additional tests including troponin, EKG, D-dimer to rule out cardiopulmonary causes of her symptoms and will obtain a CT chest abdomen pelvis to better evaluate.   1444 LFTs within normal limits renal leukocytosis or anemia.  Urine without evidence of infection. Kidney function stable without HOLLY. Lipase 30. troponin less than 6 and EKG with normal sinus rhythm rules out ACS.  Dimer 0.27 so PE unlikely.  CT chest abdomen Lexx with any acute or explanatory findings.  Patient does have a history of gastritis/GERD and stopped taking her omeprazole a couple months ago because she said \"it felt like it stopped working\".  Recommended she restart did that because she 22 when she was taking them for a while, I did feel like it really helped with her symptoms.  Will also add on Pepcid.  Tolerating PO here. Recommend she follow back up with her primary doctor.  But at this point, with reassuring laboratory workup, stable vitals throughout the course of her being here, and no CT evidence of any acute process that could be contributing, plan to have her discharge follow back up with her PCP.  She was agreeable to this plan.  Discharged in stable condition.       Medical Decision Making  Obtained supplemental history:Supplemental history obtained?: Documented in chart and Family Member/Significant Other  Reviewed external records: External records reviewed?: Documented in chart and Outpatient Record: 11/1/24 office note  Care impacted by chronic illness:Hypertension  Care significantly affected by social determinants of health:N/A  Did you consider but not order tests?: Work up " "considered but not performed and documented in chart, if applicable  Did you interpret images independently?: Independent interpretation of ECG and images noted in documentation, when applicable.  Consultation discussion with other provider:Did you involve another provider (consultant, , pharmacy, etc.)?: No  Discharge. I prescribed additional prescription strength medication(s) as charted. See documentation for any additional details.    0 minutes of critical care time     MEDICATIONS GIVEN IN THE EMERGENCY:  Medications   ondansetron (ZOFRAN) injection 4 mg (4 mg Intravenous $Given 11/3/24 1026)   ketorolac (TORADOL) injection 10 mg (10 mg Intravenous $Given 11/3/24 1026)   morphine (PF) injection 2 mg (2 mg Intravenous $Given 11/3/24 1307)   iopamidol (ISOVUE-370) solution 66 mL (66 mLs Intravenous $Given 11/3/24 1312)       NEW PRESCRIPTIONS STARTED AT TODAY'S ER VISIT  Discharge Medication List as of 11/3/2024  4:00 PM        START taking these medications    Details   famotidine (PEPCID) 20 MG tablet Take 1 tablet (20 mg) by mouth 2 times daily as needed (before meals for acid reflux)., Disp-60 tablet, R-0, E-Prescribe      ondansetron (ZOFRAN ODT) 4 MG ODT tab Take 1 tablet (4 mg) by mouth every 8 hours as needed for nausea or vomiting., Disp-20 tablet, R-0, E-Prescribe           Discharge Medication List as of 11/3/2024  4:00 PM        ================================================================    HPI    Patient information was obtained from: patient   Use of : N/A     Bushra Levy is a 64-year-old female with a history of prediabetes and hypertension, no surgical history, presenting with right upper quadrant abdominal pain for \"a long time\".  Patient has been dealing with this and has made some dietary changes to try to alleviate it.  Feels like she has the pain more when eating stirfry vegetables or meat and less when she eats bland foods like oatmeal.  Over the past week, however, the " "pain has become severe and it is every time she eats.  She has had associated nausea and diarrhea.  She feels like the pain wraps around her right side and can even feel the pain in her right shoulder.  She has felt subjective fevers.  She denies vomiting, hematochezia, hematemesis, melena, urinary symptoms, leg swelling, flulike symptoms, pain, shortness of breath although states when the pain is so bad it \"takes her breath away\".  The only thing that really makes the pain worse is pressing on the area when it is there and it occurs postprandially.     PHYSICAL EXAM    /68   Pulse 78   Temp 97.9  F (36.6  C) (Temporal)   Resp 16   Wt 61.2 kg (135 lb)   SpO2 99%   BMI 27.04 kg/m    Constitutional: Well developed, Well nourished, NAD, GCS 15  HENT: Normocephalic, Atraumatic, Oropharynx normal, mucous membranes moist, Nose normal. Neck-  Normal range of motion, No tenderness   Eyes: PERRL, EOMI, Conjunctiva normal, No discharge.   Respiratory: Normal breath sounds, No respiratory distress, No wheezing, Speaks full sentences easily. No cough.    Cardiovascular: Normal heart rate, Regular rhythm, No murmurs, No rubs, No gallops. Chest wall nontender.    GI: Bowel sounds normal, Soft, RUQ/epigastric tenderness, No masses, No flank tenderness. No rebound or guarding.    Musculoskeletal:  No edema. No cyanosis, No clubbing. Good range of motion in all major joints.   Integument: Warm, Dry, No erythema, No rash.    Neurologic: Alert & oriented x 3, Normal motor function, Normal sensory function, No focal deficits noted. Normal gait.    Psychiatric: Affect normal, Judgment normal, Mood normal. Cooperative.      LAB:  All pertinent labs reviewed and interpreted.  Results for orders placed or performed during the hospital encounter of 11/03/24   US Abdomen Limited (RUQ)    Impression    IMPRESSION:  1.  Hepatic steatosis.  2.  Similar borderline common bile duct dilatation. No cholelithiasis.     CT " Chest/Abdomen/Pelvis w Contrast    Impression    IMPRESSION:  1.  No acute abnormality in the chest, abdomen or pelvis.  2.  Additional noncritical details in the findings.   Basic metabolic panel   Result Value Ref Range    Sodium 138 135 - 145 mmol/L    Potassium 3.6 3.4 - 5.3 mmol/L    Chloride 98 98 - 107 mmol/L    Carbon Dioxide (CO2) 24 22 - 29 mmol/L    Anion Gap 16 (H) 7 - 15 mmol/L    Urea Nitrogen 7.6 (L) 8.0 - 23.0 mg/dL    Creatinine 0.49 (L) 0.51 - 0.95 mg/dL    GFR Estimate >90 >60 mL/min/1.73m2    Calcium 8.9 8.8 - 10.4 mg/dL    Glucose 137 (H) 70 - 99 mg/dL   Hepatic function panel   Result Value Ref Range    Protein Total 7.6 6.4 - 8.3 g/dL    Albumin 4.2 3.5 - 5.2 g/dL    Bilirubin Total 0.4 <=1.2 mg/dL    Alkaline Phosphatase 74 40 - 150 U/L    AST 20 0 - 45 U/L    ALT 20 0 - 50 U/L    Bilirubin Direct <0.20 0.00 - 0.30 mg/dL   Result Value Ref Range    Lipase 30 13 - 60 U/L   UA with Microscopic reflex to Culture    Specimen: Urine, Clean Catch   Result Value Ref Range    Color Urine Light Yellow Colorless, Straw, Light Yellow, Yellow    Appearance Urine Clear Clear    Glucose Urine Negative Negative mg/dL    Bilirubin Urine Negative Negative    Ketones Urine Negative Negative mg/dL    Specific Gravity Urine 1.012 1.001 - 1.030    Blood Urine Negative Negative    pH Urine 6.5 5.0 - 7.0    Protein Albumin Urine 20 (A) Negative mg/dL    Urobilinogen Urine <2.0 <2.0 mg/dL    Nitrite Urine Negative Negative    Leukocyte Esterase Urine Negative Negative    RBC Urine 1 <=2 /HPF    WBC Urine <1 <=5 /HPF    Squamous Epithelials Urine 1 <=1 /HPF   CBC with platelets and differential   Result Value Ref Range    WBC Count 9.0 4.0 - 11.0 10e3/uL    RBC Count 4.65 3.80 - 5.20 10e6/uL    Hemoglobin 14.4 11.7 - 15.7 g/dL    Hematocrit 41.7 35.0 - 47.0 %    MCV 90 78 - 100 fL    MCH 31.0 26.5 - 33.0 pg    MCHC 34.5 31.5 - 36.5 g/dL    RDW 11.7 10.0 - 15.0 %    Platelet Count 174 150 - 450 10e3/uL    %  Neutrophils 81 %    % Lymphocytes 13 %    % Monocytes 5 %    % Eosinophils 0 %    % Basophils 0 %    % Immature Granulocytes 1 %    NRBCs per 100 WBC 0 <1 /100    Absolute Neutrophils 7.3 1.6 - 8.3 10e3/uL    Absolute Lymphocytes 1.2 0.8 - 5.3 10e3/uL    Absolute Monocytes 0.4 0.0 - 1.3 10e3/uL    Absolute Eosinophils 0.0 0.0 - 0.7 10e3/uL    Absolute Basophils 0.0 0.0 - 0.2 10e3/uL    Absolute Immature Granulocytes 0.1 <=0.4 10e3/uL    Absolute NRBCs 0.0 10e3/uL   D dimer quantitative   Result Value Ref Range    D-Dimer Quantitative 0.27 0.00 - 0.50 ug/mL FEU   Result Value Ref Range    Troponin T, High Sensitivity <6 <=14 ng/L   Result Value Ref Range    Magnesium 2.0 1.7 - 2.3 mg/dL       RADIOLOGY:  Reviewed all pertinent imaging. Please see official radiology report.  CT Chest/Abdomen/Pelvis w Contrast   Final Result   IMPRESSION:   1.  No acute abnormality in the chest, abdomen or pelvis.   2.  Additional noncritical details in the findings.      US Abdomen Limited (RUQ)   Final Result   IMPRESSION:   1.  Hepatic steatosis.   2.  Similar borderline common bile duct dilatation. No cholelithiasis.             EKG:    Performed at: 11/3/2024 1224    Impression: Sinus rhythm with sinus arrhythmia    Rate: 76    Dr. Felicity Zambrano independently reviewed and interpreted the EKG(s) documented above.      Concepcion Pillai PA-C  [facility] EMERGENCY DEPARTMENT  [facility] [facility] [id number] [phone]        Concepcion Pillai PA-C  11/03/24 [id number]       Concepcion Pillai PA-C  11/03/24 1350

## 2024-11-03 NOTE — DISCHARGE INSTRUCTIONS
You were seen here in the ER for pain after eating. Your labs here are within normal limits. Your imaging does not reveal any known cause of your symptoms.  We checked out your heart and lungs as well which were within normal limits/normal. Your vitals here are stable/within normal limits. Your exam is reassuring. I will prescribe a medication that you can try that can help with the pain with meals. I will also prescribe zofran for the nausea. Follow up with your primary doctor as soon as you can. Return if you develop any new/worsening symptoms.

## 2024-11-09 LAB
ATRIAL RATE - MUSE: 76 BPM
DIASTOLIC BLOOD PRESSURE - MUSE: 72 MMHG
INTERPRETATION ECG - MUSE: NORMAL
P AXIS - MUSE: 72 DEGREES
PR INTERVAL - MUSE: 180 MS
QRS DURATION - MUSE: 78 MS
QT - MUSE: 344 MS
QTC - MUSE: 387 MS
R AXIS - MUSE: 70 DEGREES
SYSTOLIC BLOOD PRESSURE - MUSE: 147 MMHG
T AXIS - MUSE: 19 DEGREES
VENTRICULAR RATE- MUSE: 76 BPM

## 2025-08-29 ENCOUNTER — LAB REQUISITION (OUTPATIENT)
Dept: LAB | Facility: CLINIC | Age: 65
End: 2025-08-29

## 2025-08-29 DIAGNOSIS — R19.7 DIARRHEA, UNSPECIFIED: ICD-10-CM

## 2025-08-29 DIAGNOSIS — K52.9 NONINFECTIVE GASTROENTERITIS AND COLITIS, UNSPECIFIED: ICD-10-CM

## 2025-08-29 DIAGNOSIS — I10 ESSENTIAL (PRIMARY) HYPERTENSION: ICD-10-CM

## 2025-08-29 PROCEDURE — 82947 ASSAY GLUCOSE BLOOD QUANT: CPT | Performed by: FAMILY MEDICINE

## 2025-08-29 PROCEDURE — 83690 ASSAY OF LIPASE: CPT | Performed by: FAMILY MEDICINE

## 2025-08-29 PROCEDURE — 84443 ASSAY THYROID STIM HORMONE: CPT | Performed by: FAMILY MEDICINE

## 2025-08-29 PROCEDURE — 83516 IMMUNOASSAY NONANTIBODY: CPT | Performed by: FAMILY MEDICINE

## 2025-08-30 LAB
ALBUMIN SERPL BCG-MCNC: 4.4 G/DL (ref 3.5–5.2)
ALP SERPL-CCNC: 62 U/L (ref 40–150)
ALT SERPL W P-5'-P-CCNC: 29 U/L (ref 0–50)
ANION GAP SERPL CALCULATED.3IONS-SCNC: 14 MMOL/L (ref 7–15)
AST SERPL W P-5'-P-CCNC: 25 U/L (ref 0–45)
BILIRUB SERPL-MCNC: 0.2 MG/DL
BUN SERPL-MCNC: 10.4 MG/DL (ref 8–23)
CALCIUM SERPL-MCNC: 9.5 MG/DL (ref 8.8–10.4)
CHLORIDE SERPL-SCNC: 100 MMOL/L (ref 98–107)
CREAT SERPL-MCNC: 0.58 MG/DL (ref 0.51–0.95)
EGFRCR SERPLBLD CKD-EPI 2021: >90 ML/MIN/1.73M2
GLUCOSE SERPL-MCNC: 153 MG/DL (ref 70–99)
HCO3 SERPL-SCNC: 27 MMOL/L (ref 22–29)
LIPASE SERPL-CCNC: 58 U/L (ref 13–60)
POTASSIUM SERPL-SCNC: 4.1 MMOL/L (ref 3.4–5.3)
PROT SERPL-MCNC: 7.5 G/DL (ref 6.4–8.3)
SODIUM SERPL-SCNC: 141 MMOL/L (ref 135–145)
TSH SERPL DL<=0.005 MIU/L-ACNC: 0.46 UIU/ML (ref 0.3–4.2)

## 2025-09-03 LAB
GLIADIN IGA SER-ACNC: 1.9 U/ML
GLIADIN IGG SER-ACNC: <0.6 U/ML
IGA SERPL-MCNC: 717 MG/DL (ref 84–499)
TTG IGA SER-ACNC: 1.3 U/ML
TTG IGG SER-ACNC: <0.6 U/ML